# Patient Record
Sex: FEMALE | Race: OTHER | URBAN - NONMETROPOLITAN AREA
[De-identification: names, ages, dates, MRNs, and addresses within clinical notes are randomized per-mention and may not be internally consistent; named-entity substitution may affect disease eponyms.]

---

## 2019-09-09 ENCOUNTER — HOSPITAL ENCOUNTER (EMERGENCY)
Age: 19
Discharge: HOME | End: 2019-09-09

## 2019-09-09 VITALS
WEIGHT: 105 LBS | RESPIRATION RATE: 18 BRPM | TEMPERATURE: 98 F | SYSTOLIC BLOOD PRESSURE: 105 MMHG | HEIGHT: 61 IN | OXYGEN SATURATION: 100 % | DIASTOLIC BLOOD PRESSURE: 68 MMHG | BODY MASS INDEX: 19.83 KG/M2 | HEART RATE: 72 BPM

## 2019-09-09 DIAGNOSIS — Z48.02 VISIT FOR SUTURE REMOVAL: Primary | ICD-10-CM

## 2019-09-09 PROCEDURE — 99281 EMR DPT VST MAYX REQ PHY/QHP: CPT

## 2019-09-09 SDOH — HEALTH STABILITY: MENTAL HEALTH: HOW OFTEN DO YOU HAVE A DRINK CONTAINING ALCOHOL?: NEVER

## 2019-09-09 ASSESSMENT — ENCOUNTER SYMPTOMS
COUGH: 0
APNEA: 0
EYE DISCHARGE: 0
NAUSEA: 0
PHOTOPHOBIA: 0
ABDOMINAL DISTENTION: 0
COLOR CHANGE: 0
BACK PAIN: 0
SHORTNESS OF BREATH: 0
SORE THROAT: 0
RHINORRHEA: 0
EYE PAIN: 0
ABDOMINAL PAIN: 0

## 2019-09-09 NOTE — ED PROVIDER NOTES
[facility] - [facility] EMERGENCY DEPT  eMERGENCYdEPARTMENT eNCOUnter      Pt Name: Rosario Crabtree  MRN: [id number]  [place] 2000  Date of evaluation: 9/9/2019  Provider:JULIETTE Meyers    CHIEF COMPLAINT       Chief Complaint   Patient presents with    Suture / Staple Removal         HISTORY OF PRESENT ILLNESS  (Location/Symptom, Timing/Onset, Context/Setting, Quality, Duration, Modifying Factors, Severity.)   Rosario Crabtree is a 20 y.o. female who presents to the emergency department for suture removal. 8 in right forearm. 1 in left thumb. No complaints no active drainage or erythema. Has full ROM was seen one week ago. HPI    Nursing Notes were reviewed and I agree. REVIEW OF SYSTEMS    (2-9 systems for level 4, 10 or more for level 5)     Review of Systems   Constitutional: Negative for activity change, appetite change, chills and fever. HENT: Negative for congestion, postnasal drip, rhinorrhea and sore throat. Eyes: Negative for photophobia, pain, discharge and visual disturbance. Respiratory: Negative for apnea, cough and shortness of breath. Cardiovascular: Negative for chest pain and leg swelling. Gastrointestinal: Negative for abdominal distention, abdominal pain and nausea. Genitourinary: Negative for vaginal bleeding. Musculoskeletal: Negative for arthralgias, back pain, joint swelling, neck pain and neck stiffness. Skin: Positive for wound. Negative for color change and rash. Neurological: Negative for dizziness, syncope, facial asymmetry and headaches. Hematological: Negative for adenopathy. Does not bruise/bleed easily. Psychiatric/Behavioral: Negative for agitation, behavioral problems and confusion. Except as noted above the remainder of the review of systems was reviewed and negative. PAST MEDICAL HISTORY   History reviewed. No pertinent past medical history. SURGICAL HISTORY     History reviewed. No pertinent surgical history.       CURRENT MEDICATIONS Marlen Dolan, [place]  09/09/19 [id number]

## 2020-01-20 ENCOUNTER — OFFICE VISIT (OUTPATIENT)
Dept: RETAIL CLINIC | Facility: CLINIC | Age: 20
End: 2020-01-20

## 2020-01-20 DIAGNOSIS — T78.40XA ALLERGIC REACTION, INITIAL ENCOUNTER: Primary | ICD-10-CM

## 2020-01-20 PROCEDURE — 99203 OFFICE O/P NEW LOW 30 MIN: CPT | Performed by: NURSE PRACTITIONER

## 2020-01-20 RX ORDER — METHYLPREDNISOLONE ACETATE 80 MG/ML
80 INJECTION, SUSPENSION INTRA-ARTICULAR; INTRALESIONAL; INTRAMUSCULAR; SOFT TISSUE ONCE
Status: COMPLETED | OUTPATIENT
Start: 2020-01-20 | End: 2020-01-20

## 2020-01-20 RX ORDER — DIPHENHYDRAMINE HCL 25 MG
50 CAPSULE ORAL ONCE
Status: COMPLETED | OUTPATIENT
Start: 2020-01-20 | End: 2020-01-20

## 2020-01-20 RX ADMIN — Medication 50 MG: at 13:24

## 2020-01-20 RX ADMIN — METHYLPREDNISOLONE ACETATE 80 MG: 80 INJECTION, SUSPENSION INTRA-ARTICULAR; INTRALESIONAL; INTRAMUSCULAR; SOFT TISSUE at 13:21

## 2020-01-20 NOTE — PATIENT INSTRUCTIONS
Stop taking the medicine you were given for flu. May take benadryl 25 mg every four hours as needed for rash.     Erupción medicamentosa  Drug Rash    Luis erupción medicamentosa ocurre cuando un medicamento provoca un cambio en el color o la textura de la piel. Puede desarrollarse minutos, horas o días después de lisset el medicamento. La erupción puede aparecer en luis aris pequeña de piel o en todo el cuerpo.  ¿Cuáles son las causas?  Generalmente, la causa de esta afección es la reacción del cuerpo (alergia) a un medicamento. También puede ser causada por la exposición a la amador del sol después de lisset un medicamento que hace que la piel sea sensible a la amador.  Aunque cualquier medicamento puede causar luis erupción o reacción, los medicamentos que más probablemente causen luis erupción incluyen los siguientes:  · La penicilina.  · Los antibióticos.  · Los medicamentos para tratar las convulsiones.  · Los medicamentos para tratar el cáncer (quimioterapia).  · La aspirina y otros antiinflamatorios no esteroideos (DONTAE).  · Los tintes inyectables que contienen yodo.  · La insulina.  ¿Cuáles son los signos o los síntomas?  Los síntomas de esta afección incluyen los siguientes:  · Enrojecimiento.  · Pequeñas protuberancias.  · Descamación.  · Picazón.  · Ronchas que causan picazón (urticaria).  · Hinchazón.  ¿Cómo se diagnostica?  Esta afección se puede diagnosticar en función de lo siguiente:  · Un examen físico.  · Estudios para averiguar qué medicamentos causaron la erupción. Estos estudios pueden incluir los siguientes:  ? Pruebas cutáneas.  ? Análisis de kiko.  ? Prueba de provocación. Para esta prueba, debe dejar de lisset todos los medicamentos que no necesita recibir. Luego, debe comenzar a tomarlos nuevamente agregando un medicamento a la vez.  ¿Cómo se trata?  Esta afección se trata con medicamentos, por ejemplo:  · Un antihistamínico. [place] se puede administrar para aliviar la picazón.  · Antiinflamatorios no  esteroides (DONTAE). Estos se pueden administrar para reducir la hinchazón y para tratar el dolor.  · Un medicamento con corticoesteroide. Herminia se puede administrar para reducir la hinchazón.  Generalmente, la erupción desaparece al dejar de lisset el medicamento que la causó.  Siga estas indicaciones en mary casa:  · [place] los medicamentos de venta darrion y los recetados solamente clay se lo haya indicado el médico.  · Informe a todos larissa médicos acerca de cualquier reacción medicamentosa que haya tenido en el pasado.  · Si la erupción fue ocasionada por la sensibilidad a la amador del sol, mientras se agustina la erupción:  ? De ser posible, evite estar al sol, en especial cuando está más edd, normalmente entre las 10 a. m. y las 4 p. m.  ? Cúbrase la piel con pantalones, mangas largas y un sombrero cuando esté expuesto a la amador del sol.  · Si tiene urticaria:  ? [place] rebecca ducha de agua fría o utilice rebecca compresa fría para aliviar la picazón.  ? Utilice antihistamínicos de venta darrion, según lo recomendado por el médico, hasta que la urticaria haya desaparecido. La urticaria no es contagiosa.  · Concurra a todas las visitas de seguimiento clay se lo haya indicado el médico. [place] es importante.  Comuníquese con un médico si tiene:  · Fiebre.  · Rebecca erupción que no desaparece.  · Rebecca erupción que empeora.  · Rebecca erupción que reaparece.  · Tos o sibilancias.  Solicite ayuda de inmediato si:  · Comienza a tener problemas respiratorios.  · Comienza a faltarle el aire.  · El handy o la garganta comienza a hinchársele.  · Tiene debilidad intensa con mareos o desmayos.  · Siente dolor en el pecho.  Estos síntomas pueden representar un problema grave que constituye rebecca emergencia. No espere hasta que los síntomas desaparezcan. Solicite atención médica de inmediato. Comuníquese con el servicio de emergencias de mary localidad (911 en los [place]). No conduzca por larissa propios medios hasta el hospital.  Resumen  · Rebecca erupción  medicamentosa ocurre cuando un medicamento provoca un cambio en el color o la textura de la piel. La erupción puede aparecer en luis aris pequeña de piel o en todo el cuerpo.  · Puede desarrollarse minutos, horas o días después de lisset el medicamento.  · El médico hará varios estudios para determinar qué medicamento causó la erupción.  · La erupción puede tratarse con medicamentos para aliviar la picazón, la hinchazón y el dolor.  Esta información no tiene clay fin reemplazar el consejo del médico. Asegúrese de hacerle al médico cualquier pregunta que tenga.  Document Released: 12/18/2006 Document Revised: 01/11/2019 Document Reviewed: 01/11/2019  Elsevier Interactive Patient Education © 2019 Elsevier Inc.

## 2020-01-20 NOTE — PROGRESS NOTES
Subjective   Sabrina HODGSON is a 19 y.o. female.     Itz her friend is translating for her and states she was diagnosed with the flu. At a clinic at her work they gave her some flu medicine and cough medicine. They think it was Tamiflu and Mucinex. She started the medicine yesterday and broke out in a rash all over her body. It does itch. She denies difficulty breathing. She took more medicine this morning and the rash got worse. Office staff attempted to contact her employer but had no luck reaching a health care provider to get more information on what she took. She does not know what pharmacy dispensed the medicine.    Rash   This is a new problem. The current episode started yesterday. The rash is diffuse. The rash is characterized by pain, itchiness and redness. She was exposed to a new medication. Associated symptoms include rhinorrhea. Pertinent negatives include no anorexia, congestion, cough, diarrhea, eye pain, facial edema, fatigue, fever, joint pain, nail changes, shortness of breath, sore throat or vomiting. Past treatments include nothing. The treatment provided no relief. There is no history of allergies, asthma, eczema or varicella.        The following portions of the patient's history were reviewed and updated as appropriate: allergies, current medications, past family history, past medical history, past social history, past surgical history and problem list.    Review of Systems   Constitutional: Negative for fatigue and fever.   HENT: Positive for rhinorrhea. Negative for congestion and sore throat.    Eyes: Negative for pain.   Respiratory: Negative for cough and shortness of breath.    Gastrointestinal: Negative for anorexia, diarrhea and vomiting.   Musculoskeletal: Negative for joint pain.   Skin: Positive for rash. Negative for nail changes.       Objective   Physical Exam   Constitutional: She is oriented to person, place, and time. She appears well-developed and well-nourished.   HENT:    Head: Normocephalic.   Right Ear: External ear normal.   Left Ear: External ear normal.   Mouth/Throat: Oropharynx is clear and moist.   Eyes: Conjunctivae are normal.   Cardiovascular: Regular rhythm.   Pulmonary/Chest: Effort normal and breath sounds normal. No respiratory distress.   Abdominal: Soft.   Neurological: She is alert and oriented to person, place, and time.   Skin: Rash noted.         Assessment/Plan   Sabrina was seen today for rash.    Diagnoses and all orders for this visit:    Allergic reaction, initial encounter  -     methylPREDNISolone acetate (DEPO-medrol) injection 80 mg  -     diphenhydrAMINE (BENADRYL) capsule 50 mg        Instructed patient and friend to stop taking the medicine she was given by her work clinic. Take benadryl every four hours as needed for rash. If she develops any difficulty in breathing go to the ER.   I asked the patient to wait for 30 mins after the administration of the steroid shot and benadryl to ensure no allergic reaction occurred.

## 2021-06-24 ENCOUNTER — INITIAL PRENATAL (OUTPATIENT)
Dept: OBSTETRICS AND GYNECOLOGY | Facility: CLINIC | Age: 21
End: 2021-06-24

## 2021-06-24 VITALS
HEIGHT: 61 IN | WEIGHT: 132 LBS | DIASTOLIC BLOOD PRESSURE: 64 MMHG | SYSTOLIC BLOOD PRESSURE: 104 MMHG | BODY MASS INDEX: 24.92 KG/M2

## 2021-06-24 DIAGNOSIS — B37.31 CANDIDIASIS, VAGINA: ICD-10-CM

## 2021-06-24 DIAGNOSIS — O34.219 PREVIOUS CESAREAN DELIVERY, ANTEPARTUM: Primary | ICD-10-CM

## 2021-06-24 PROCEDURE — 99203 OFFICE O/P NEW LOW 30 MIN: CPT | Performed by: OBSTETRICS & GYNECOLOGY

## 2021-06-24 RX ORDER — FLUCONAZOLE 150 MG/1
150 TABLET ORAL ONCE
Qty: 1 TABLET | Refills: 0 | Status: SHIPPED | OUTPATIENT
Start: 2021-06-24 | End: 2021-06-24

## 2021-06-24 NOTE — PROGRESS NOTES
New OB, US ordered today, reviewed and shows 23 weeks gestation, EDC 10/18/21  Late entry to care, c/o yellow vaginal discharge  Prior uncomplicated pregnancy and  delivery in [facility]  Feeling fetal movement  Thick white discharge on speculum exam  New OB labs ordered today  She asks about the option of TOLAC, discussed 1% risk of uterine rupture  Discussed normal prenatal routines  Questions answered    Diagnoses and all orders for this visit:    1. Previous  delivery, antepartum (Primary)  -     Chlamydia trachomatis, Neisseria gonorrhoeae, PCR w/ confirmation - Urine, Urine, Clean Catch  -     ABO / Rh  -     Ambulatory Referral to Perinatology  -     Antibody Screen  -     CBC & Differential  -     Urine Culture - Urine, Urine, Clean Catch  -     ToxASSURE Select 13 (MW) - Urine, Clean Catch  -     RPR  -     Rubella Antibody, IgG  -     HIV-1 / O / 2 Ag / Antibody 4th Generation  -     Hepatitis B Surface Antigen

## 2021-07-01 LAB
ABO GROUP BLD: NORMAL
BACTERIA UR CULT: ABNORMAL
BACTERIA UR CULT: ABNORMAL
BASOPHILS # BLD AUTO: 0.04 10*3/MM3 (ref 0–0.2)
BASOPHILS NFR BLD AUTO: 0.4 % (ref 0–1.5)
BLD GP AB SCN SERPL QL: NEGATIVE
C TRACH RRNA SPEC QL NAA+PROBE: POSITIVE
C TRACH RRNA SPEC QL NAA+PROBE: POSITIVE
DRUGS UR: NORMAL
EOSINOPHIL # BLD AUTO: 0.05 10*3/MM3 (ref 0–0.4)
EOSINOPHIL NFR BLD AUTO: 0.5 % (ref 0.3–6.2)
ERYTHROCYTE [DISTWIDTH] IN BLOOD BY AUTOMATED COUNT: 12.4 % (ref 12.3–15.4)
HBV SURFACE AG SERPL QL IA: NEGATIVE
HCT VFR BLD AUTO: 34.7 % (ref 34–46.6)
HGB BLD-MCNC: 11.1 G/DL (ref 12–15.9)
HIV 1+2 AB+HIV1 P24 AG SERPL QL IA: NON REACTIVE
IMM GRANULOCYTES # BLD AUTO: 0.02 10*3/MM3 (ref 0–0.05)
IMM GRANULOCYTES NFR BLD AUTO: 0.2 % (ref 0–0.5)
LYMPHOCYTES # BLD AUTO: 1.87 10*3/MM3 (ref 0.7–3.1)
LYMPHOCYTES NFR BLD AUTO: 19.7 % (ref 19.6–45.3)
MCH RBC QN AUTO: 30.4 PG (ref 26.6–33)
MCHC RBC AUTO-ENTMCNC: 32 G/DL (ref 31.5–35.7)
MCV RBC AUTO: 95.1 FL (ref 79–97)
MONOCYTES # BLD AUTO: 0.5 10*3/MM3 (ref 0.1–0.9)
MONOCYTES NFR BLD AUTO: 5.3 % (ref 5–12)
N GONORRHOEA RRNA SPEC QL NAA+PROBE: NEGATIVE
NEUTROPHILS # BLD AUTO: 7 10*3/MM3 (ref 1.7–7)
NEUTROPHILS NFR BLD AUTO: 73.9 % (ref 42.7–76)
NRBC BLD AUTO-RTO: 0 /100 WBC (ref 0–0.2)
PLATELET # BLD AUTO: 340 10*3/MM3 (ref 140–450)
RBC # BLD AUTO: 3.65 10*6/MM3 (ref 3.77–5.28)
RH BLD: POSITIVE
RPR SER QL: NON REACTIVE
RUBV IGG SERPL IA-ACNC: 1.16 INDEX
WBC # BLD AUTO: 9.48 10*3/MM3 (ref 3.4–10.8)

## 2021-08-24 ENCOUNTER — ROUTINE PRENATAL (OUTPATIENT)
Dept: OBSTETRICS AND GYNECOLOGY | Facility: CLINIC | Age: 21
End: 2021-08-24

## 2021-08-24 VITALS — BODY MASS INDEX: 25.7 KG/M2 | SYSTOLIC BLOOD PRESSURE: 100 MMHG | WEIGHT: 136 LBS | DIASTOLIC BLOOD PRESSURE: 64 MMHG

## 2021-08-24 DIAGNOSIS — O34.219 PREVIOUS CESAREAN DELIVERY, ANTEPARTUM: Primary | ICD-10-CM

## 2021-08-24 LAB
GLUCOSE UR STRIP-MCNC: ABNORMAL MG/DL
PROT UR STRIP-MCNC: NEGATIVE MG/DL

## 2021-08-24 PROCEDURE — 99213 OFFICE O/P EST LOW 20 MIN: CPT | Performed by: OBSTETRICS & GYNECOLOGY

## 2021-08-24 RX ORDER — AZITHROMYCIN 500 MG/1
1000 TABLET, FILM COATED ORAL ONCE
Qty: 2 TABLET | Refills: 0 | Status: SHIPPED | OUTPATIENT
Start: 2021-08-24 | End: 2021-08-24

## 2021-08-24 NOTE — PROGRESS NOTES
Good fetal movement  Has not been seen in 9 weeks  Rx Azithromycin for + Chlamydia  Glucola and Hgb today  US next visit for limited prenatal care  Discuss Tdap next visit   labor precautions  Again discussed delivery options and patient still desires TOLAC, discussed risk of uterine rupture  It is unclear why she had her first , so it is difficult to estimate chance of successful     Diagnoses and all orders for this visit:    1. Previous  delivery, antepartum (Primary)  -     azithromycin (Zithromax) 500 MG tablet; Take 2 tablets by mouth 1 (One) Time for 1 dose.  Dispense: 2 tablet; Refill: 0  -     Gestational Screen 1 Hr ([facility])  -     Hemoglobin

## 2021-08-25 LAB — GLUCOSE 1H P 50 G GLC PO SERPL-MCNC: 164 MG/DL (ref 65–139)

## 2021-09-09 ENCOUNTER — ROUTINE PRENATAL (OUTPATIENT)
Dept: OBSTETRICS AND GYNECOLOGY | Facility: CLINIC | Age: 21
End: 2021-09-09

## 2021-09-09 VITALS — DIASTOLIC BLOOD PRESSURE: 68 MMHG | BODY MASS INDEX: 26.83 KG/M2 | WEIGHT: 142 LBS | SYSTOLIC BLOOD PRESSURE: 106 MMHG

## 2021-09-09 DIAGNOSIS — O99.810 ABNORMAL GLUCOSE TOLERANCE IN PREGNANCY: ICD-10-CM

## 2021-09-09 DIAGNOSIS — O34.219 PREVIOUS CESAREAN DELIVERY, ANTEPARTUM: Primary | ICD-10-CM

## 2021-09-09 LAB
GLUCOSE UR STRIP-MCNC: NEGATIVE MG/DL
PROT UR STRIP-MCNC: ABNORMAL MG/DL

## 2021-09-09 PROCEDURE — 99213 OFFICE O/P EST LOW 20 MIN: CPT | Performed by: OBSTETRICS & GYNECOLOGY

## 2021-09-09 NOTE — PROGRESS NOTES
Good fetal movement  Missed appointment on Tuesday  Growth US next visit  Reviewed abnormal 1 hour glucose screen, needs to do 3 hour test    Diagnoses and all orders for this visit:    1. Previous  delivery, antepartum (Primary)    2. Abnormal glucose tolerance in pregnancy

## 2021-09-16 ENCOUNTER — ROUTINE PRENATAL (OUTPATIENT)
Dept: OBSTETRICS AND GYNECOLOGY | Facility: CLINIC | Age: 21
End: 2021-09-16

## 2021-09-16 VITALS — BODY MASS INDEX: 25.89 KG/M2 | DIASTOLIC BLOOD PRESSURE: 68 MMHG | WEIGHT: 137 LBS | SYSTOLIC BLOOD PRESSURE: 104 MMHG

## 2021-09-16 DIAGNOSIS — O99.810 ABNORMAL GLUCOSE TOLERANCE TEST IN PREGNANCY: ICD-10-CM

## 2021-09-16 DIAGNOSIS — O34.219 PREVIOUS CESAREAN DELIVERY, ANTEPARTUM: Primary | ICD-10-CM

## 2021-09-16 LAB
GLUCOSE UR STRIP-MCNC: NEGATIVE MG/DL
PROT UR STRIP-MCNC: NEGATIVE MG/DL

## 2021-09-16 PROCEDURE — 99213 OFFICE O/P EST LOW 20 MIN: CPT | Performed by: OBSTETRICS & GYNECOLOGY

## 2021-09-16 NOTE — PROGRESS NOTES
Good fetal movement  Labor precautions  Ultrasound today 12% growth, WILFRIDO 12.5cm  3 hour glucose screening topday  Prior GBS positive urine culture  Repeat GC/Chl next visit  Still desires TOLAC    Diagnoses and all orders for this visit:    1. Previous  delivery, antepartum (Primary)    2. Abnormal glucose tolerance test in pregnancy  -     Gestational Screen 3 Hr ([facility])

## 2021-09-17 LAB
GLUCOSE 1H P 100 G GLC PO SERPL-MCNC: 134 MG/DL (ref 65–179)
GLUCOSE 2H P 100 G GLC PO SERPL-MCNC: 89 MG/DL (ref 65–154)
GLUCOSE 3H P 100 G GLC PO SERPL-MCNC: 116 MG/DL (ref 65–139)
GLUCOSE P FAST SERPL-MCNC: 85 MG/DL (ref 65–94)

## 2021-09-27 ENCOUNTER — ROUTINE PRENATAL (OUTPATIENT)
Dept: OBSTETRICS AND GYNECOLOGY | Facility: CLINIC | Age: 21
End: 2021-09-27

## 2021-09-27 VITALS — SYSTOLIC BLOOD PRESSURE: 112 MMHG | DIASTOLIC BLOOD PRESSURE: 82 MMHG | WEIGHT: 140 LBS | BODY MASS INDEX: 26.45 KG/M2

## 2021-09-27 DIAGNOSIS — O34.219 PREVIOUS CESAREAN DELIVERY, ANTEPARTUM: Primary | ICD-10-CM

## 2021-09-27 LAB
GLUCOSE UR STRIP-MCNC: NEGATIVE MG/DL
PROT UR STRIP-MCNC: ABNORMAL MG/DL

## 2021-09-27 PROCEDURE — 99213 OFFICE O/P EST LOW 20 MIN: CPT | Performed by: OBSTETRICS & GYNECOLOGY

## 2021-09-27 RX ORDER — FAMOTIDINE 20 MG/1
20 TABLET, FILM COATED ORAL 2 TIMES DAILY
Qty: 30 TABLET | Refills: 2 | Status: SHIPPED | OUTPATIENT
Start: 2021-09-27 | End: 2021-11-11

## 2021-09-27 NOTE — PROGRESS NOTES
Good fetal movement  Few contractions  Repeat Chlamydia test today  Prior GBS positive urine  Labor instructions    Diagnoses and all orders for this visit:    1. Previous  delivery, antepartum (Primary)  -     Chlamydia trachomatis, Neisseria gonorrhoeae, PCR w/ confirmation - Swab, Cervix

## 2021-09-29 LAB
C TRACH RRNA SPEC QL NAA+PROBE: NEGATIVE
N GONORRHOEA RRNA SPEC QL NAA+PROBE: NEGATIVE

## 2021-10-06 ENCOUNTER — ROUTINE PRENATAL (OUTPATIENT)
Dept: OBSTETRICS AND GYNECOLOGY | Facility: CLINIC | Age: 21
End: 2021-10-06

## 2021-10-06 VITALS — SYSTOLIC BLOOD PRESSURE: 108 MMHG | WEIGHT: 139 LBS | BODY MASS INDEX: 26.26 KG/M2 | DIASTOLIC BLOOD PRESSURE: 68 MMHG

## 2021-10-06 DIAGNOSIS — Z34.93 PRENATAL CARE IN THIRD TRIMESTER: Primary | ICD-10-CM

## 2021-10-06 LAB
GLUCOSE UR STRIP-MCNC: NEGATIVE MG/DL
PROT UR STRIP-MCNC: NEGATIVE MG/DL

## 2021-10-06 PROCEDURE — 99212 OFFICE O/P EST SF 10 MIN: CPT | Performed by: OBSTETRICS & GYNECOLOGY

## 2021-10-06 NOTE — PROGRESS NOTES
at 38.2 IUP Presents for follow up prenatal care visit. She reports occasional contractions at this time but no regular contractions. She has a  with her named Junie Zhu.  PE see above   A/P  at 38.2 IUP   Discussed risk benefits to trial of labor versus repeat  section.  Discussed with patient risk of uterine rupture and that is less than 1%.  At this point patient requesting to schedule repeat  section.  We will have patient return to clinic to further discuss this with Dr. Fried on Monday.  Labor precautions discussed.  I offered to use the  phone but patient reported that she felt comfortable using the  that the health department had provided with her.

## 2021-10-15 ENCOUNTER — ANESTHESIA EVENT (OUTPATIENT)
Dept: LABOR AND DELIVERY | Facility: HOSPITAL | Age: 21
End: 2021-10-15

## 2021-10-15 ENCOUNTER — HOSPITAL ENCOUNTER (INPATIENT)
Facility: HOSPITAL | Age: 21
LOS: 2 days | Discharge: HOME | End: 2021-10-17
Attending: OBSTETRICS & GYNECOLOGY | Admitting: OBSTETRICS & GYNECOLOGY

## 2021-10-15 ENCOUNTER — ANESTHESIA (OUTPATIENT)
Dept: LABOR AND DELIVERY | Facility: HOSPITAL | Age: 21
End: 2021-10-15

## 2021-10-15 DIAGNOSIS — Z98.891 STATUS POST REPEAT LOW TRANSVERSE CESAREAN SECTION: Primary | ICD-10-CM

## 2021-10-15 LAB
ABO GROUP BLD: NORMAL
BLD GP AB SCN SERPL QL: NEGATIVE
DEPRECATED RDW RBC AUTO: 47.1 FL (ref 37–54)
ERYTHROCYTE [DISTWIDTH] IN BLOOD BY AUTOMATED COUNT: 15.3 % (ref 12.3–15.4)
HCT VFR BLD AUTO: 31.4 % (ref 34–46.6)
HGB BLD-MCNC: 10 G/DL (ref 12–15.9)
MCH RBC QN AUTO: 27 PG (ref 26.6–33)
MCHC RBC AUTO-ENTMCNC: 31.8 G/DL (ref 31.5–35.7)
MCV RBC AUTO: 84.9 FL (ref 79–97)
PLATELET # BLD AUTO: 350 10*3/MM3 (ref 140–450)
PMV BLD AUTO: 10.2 FL (ref 6–12)
RBC # BLD AUTO: 3.7 10*6/MM3 (ref 3.77–5.28)
RH BLD: POSITIVE
SARS-COV-2 RNA PNL SPEC NAA+PROBE: NOT DETECTED
T&S EXPIRATION DATE: NORMAL
WBC # BLD AUTO: 6.58 10*3/MM3 (ref 3.4–10.8)

## 2021-10-15 PROCEDURE — 86850 RBC ANTIBODY SCREEN: CPT | Performed by: OBSTETRICS & GYNECOLOGY

## 2021-10-15 PROCEDURE — 86900 BLOOD TYPING SEROLOGIC ABO: CPT | Performed by: OBSTETRICS & GYNECOLOGY

## 2021-10-15 PROCEDURE — 85027 COMPLETE CBC AUTOMATED: CPT | Performed by: OBSTETRICS & GYNECOLOGY

## 2021-10-15 PROCEDURE — 59515 CESAREAN DELIVERY: CPT | Performed by: OBSTETRICS & GYNECOLOGY

## 2021-10-15 PROCEDURE — 25010000002 NALBUPHINE PER 10 MG: Performed by: NURSE ANESTHETIST, CERTIFIED REGISTERED

## 2021-10-15 PROCEDURE — 86901 BLOOD TYPING SEROLOGIC RH(D): CPT | Performed by: OBSTETRICS & GYNECOLOGY

## 2021-10-15 PROCEDURE — 87635 SARS-COV-2 COVID-19 AMP PRB: CPT | Performed by: OBSTETRICS & GYNECOLOGY

## 2021-10-15 PROCEDURE — 25010000002 ONDANSETRON PER 1 MG: Performed by: NURSE ANESTHETIST, CERTIFIED REGISTERED

## 2021-10-15 PROCEDURE — 25010000002 KETOROLAC TROMETHAMINE PER 15 MG: Performed by: NURSE ANESTHETIST, CERTIFIED REGISTERED

## 2021-10-15 PROCEDURE — 25010000002 HYDROMORPHONE 1 MG/ML SOLUTION: Performed by: NURSE ANESTHETIST, CERTIFIED REGISTERED

## 2021-10-15 PROCEDURE — 51702 INSERT TEMP BLADDER CATH: CPT

## 2021-10-15 PROCEDURE — 25010000002 CEFAZOLIN PER 500 MG: Performed by: OBSTETRICS & GYNECOLOGY

## 2021-10-15 RX ORDER — OXYTOCIN 10 [USP'U]/ML
INJECTION, SOLUTION INTRAMUSCULAR; INTRAVENOUS AS NEEDED
Status: DISCONTINUED | OUTPATIENT
Start: 2021-10-15 | End: 2021-10-15 | Stop reason: SURG

## 2021-10-15 RX ORDER — LIDOCAINE HYDROCHLORIDE 10 MG/ML
5 INJECTION, SOLUTION EPIDURAL; INFILTRATION; INTRACAUDAL; PERINEURAL AS NEEDED
Status: DISCONTINUED | OUTPATIENT
Start: 2021-10-15 | End: 2021-10-16 | Stop reason: HOSPADM

## 2021-10-15 RX ORDER — OXYTOCIN/0.9 % SODIUM CHLORIDE 30/500 ML
999 PLASTIC BAG, INJECTION (ML) INTRAVENOUS ONCE
Status: DISCONTINUED | OUTPATIENT
Start: 2021-10-15 | End: 2021-10-16 | Stop reason: HOSPADM

## 2021-10-15 RX ORDER — OXYTOCIN/0.9 % SODIUM CHLORIDE 30/500 ML
250 PLASTIC BAG, INJECTION (ML) INTRAVENOUS CONTINUOUS
Status: ACTIVE | OUTPATIENT
Start: 2021-10-15 | End: 2021-10-15

## 2021-10-15 RX ORDER — BUPIVACAINE HYDROCHLORIDE 7.5 MG/ML
INJECTION, SOLUTION EPIDURAL; RETROBULBAR AS NEEDED
Status: DISCONTINUED | OUTPATIENT
Start: 2021-10-15 | End: 2021-10-15 | Stop reason: SURG

## 2021-10-15 RX ORDER — BUPIVACAINE HCL/0.9 % NACL/PF 0.1 %
2 PLASTIC BAG, INJECTION (ML) EPIDURAL ONCE
Status: COMPLETED | OUTPATIENT
Start: 2021-10-15 | End: 2021-10-15

## 2021-10-15 RX ORDER — ONDANSETRON 2 MG/ML
INJECTION INTRAMUSCULAR; INTRAVENOUS AS NEEDED
Status: DISCONTINUED | OUTPATIENT
Start: 2021-10-15 | End: 2021-10-15 | Stop reason: SURG

## 2021-10-15 RX ORDER — TRISODIUM CITRATE DIHYDRATE AND CITRIC ACID MONOHYDRATE 500; 334 MG/5ML; MG/5ML
15 SOLUTION ORAL ONCE
Status: COMPLETED | OUTPATIENT
Start: 2021-10-15 | End: 2021-10-15

## 2021-10-15 RX ORDER — FAMOTIDINE 10 MG/ML
20 INJECTION, SOLUTION INTRAVENOUS ONCE AS NEEDED
Status: COMPLETED | OUTPATIENT
Start: 2021-10-15 | End: 2021-10-15

## 2021-10-15 RX ORDER — OXYTOCIN/0.9 % SODIUM CHLORIDE 30/500 ML
125 PLASTIC BAG, INJECTION (ML) INTRAVENOUS CONTINUOUS PRN
Status: DISCONTINUED | OUTPATIENT
Start: 2021-10-15 | End: 2021-10-16 | Stop reason: HOSPADM

## 2021-10-15 RX ORDER — METHYLERGONOVINE MALEATE 0.2 MG/ML
200 INJECTION INTRAVENOUS ONCE AS NEEDED
Status: CANCELLED | OUTPATIENT
Start: 2021-10-15

## 2021-10-15 RX ORDER — CARBOPROST TROMETHAMINE 250 UG/ML
250 INJECTION, SOLUTION INTRAMUSCULAR
Status: CANCELLED | OUTPATIENT
Start: 2021-10-15

## 2021-10-15 RX ORDER — MISOPROSTOL 200 UG/1
800 TABLET ORAL ONCE AS NEEDED
Status: CANCELLED | OUTPATIENT
Start: 2021-10-15

## 2021-10-15 RX ORDER — SODIUM CHLORIDE, SODIUM LACTATE, POTASSIUM CHLORIDE, CALCIUM CHLORIDE 600; 310; 30; 20 MG/100ML; MG/100ML; MG/100ML; MG/100ML
125 INJECTION, SOLUTION INTRAVENOUS CONTINUOUS
Status: DISCONTINUED | OUTPATIENT
Start: 2021-10-15 | End: 2021-10-17 | Stop reason: HOSPADM

## 2021-10-15 RX ORDER — MISOPROSTOL 200 UG/1
800 TABLET ORAL ONCE
Status: COMPLETED | OUTPATIENT
Start: 2021-10-16 | End: 2021-10-15

## 2021-10-15 RX ORDER — IBUPROFEN 600 MG/1
600 TABLET ORAL EVERY 8 HOURS PRN
Status: DISCONTINUED | OUTPATIENT
Start: 2021-10-15 | End: 2021-10-17 | Stop reason: HOSPADM

## 2021-10-15 RX ORDER — KETOROLAC TROMETHAMINE 30 MG/ML
INJECTION, SOLUTION INTRAMUSCULAR; INTRAVENOUS AS NEEDED
Status: DISCONTINUED | OUTPATIENT
Start: 2021-10-15 | End: 2021-10-15 | Stop reason: SURG

## 2021-10-15 RX ORDER — SODIUM CHLORIDE 0.9 % (FLUSH) 0.9 %
10 SYRINGE (ML) INJECTION EVERY 12 HOURS SCHEDULED
Status: DISCONTINUED | OUTPATIENT
Start: 2021-10-15 | End: 2021-10-16 | Stop reason: HOSPADM

## 2021-10-15 RX ORDER — NALBUPHINE HCL 10 MG/ML
2.5 AMPUL (ML) INJECTION EVERY 4 HOURS PRN
Status: DISPENSED | OUTPATIENT
Start: 2021-10-15 | End: 2021-10-16

## 2021-10-15 RX ORDER — SODIUM CHLORIDE 0.9 % (FLUSH) 0.9 %
10 SYRINGE (ML) INJECTION AS NEEDED
Status: DISCONTINUED | OUTPATIENT
Start: 2021-10-15 | End: 2021-10-16 | Stop reason: HOSPADM

## 2021-10-15 RX ADMIN — FAMOTIDINE 20 MG: 10 INJECTION INTRAVENOUS at 19:49

## 2021-10-15 RX ADMIN — NALBUPHINE HYDROCHLORIDE 2.5 MG: 10 INJECTION, SOLUTION INTRAMUSCULAR; INTRAVENOUS; SUBCUTANEOUS at 22:00

## 2021-10-15 RX ADMIN — HYDROMORPHONE HYDROCHLORIDE 300 MCG: 1 INJECTION, SOLUTION INTRAMUSCULAR; INTRAVENOUS; SUBCUTANEOUS at 20:28

## 2021-10-15 RX ADMIN — SODIUM CHLORIDE, POTASSIUM CHLORIDE, SODIUM LACTATE AND CALCIUM CHLORIDE 125 ML/HR: 600; 310; 30; 20 INJECTION, SOLUTION INTRAVENOUS at 16:40

## 2021-10-15 RX ADMIN — HYDROMORPHONE HYDROCHLORIDE 200 MCG: 1 INJECTION, SOLUTION INTRAMUSCULAR; INTRAVENOUS; SUBCUTANEOUS at 20:38

## 2021-10-15 RX ADMIN — SODIUM CHLORIDE, POTASSIUM CHLORIDE, SODIUM LACTATE AND CALCIUM CHLORIDE: 600; 310; 30; 20 INJECTION, SOLUTION INTRAVENOUS at 20:55

## 2021-10-15 RX ADMIN — SODIUM CHLORIDE, POTASSIUM CHLORIDE, SODIUM LACTATE AND CALCIUM CHLORIDE: 600; 310; 30; 20 INJECTION, SOLUTION INTRAVENOUS at 20:22

## 2021-10-15 RX ADMIN — HYDROMORPHONE HYDROCHLORIDE 200 MCG: 1 INJECTION, SOLUTION INTRAMUSCULAR; INTRAVENOUS; SUBCUTANEOUS at 20:54

## 2021-10-15 RX ADMIN — Medication 2 G: at 20:10

## 2021-10-15 RX ADMIN — MISOPROSTOL 800 MCG: 200 TABLET ORAL at 23:22

## 2021-10-15 RX ADMIN — OXYTOCIN 20 UNITS: 10 INJECTION, SOLUTION INTRAMUSCULAR; INTRAVENOUS at 20:55

## 2021-10-15 RX ADMIN — SODIUM CITRATE AND CITRIC ACID MONOHYDRATE 15 ML: 500; 334 SOLUTION ORAL at 19:49

## 2021-10-15 RX ADMIN — KETOROLAC TROMETHAMINE 30 MG: 30 INJECTION, SOLUTION INTRAMUSCULAR; INTRAVENOUS at 20:55

## 2021-10-15 RX ADMIN — HYDROMORPHONE HYDROCHLORIDE 200 MCG: 1 INJECTION, SOLUTION INTRAMUSCULAR; INTRAVENOUS; SUBCUTANEOUS at 20:29

## 2021-10-15 RX ADMIN — BUPIVACAINE HYDROCHLORIDE 1.4 ML: 7.5 INJECTION, SOLUTION EPIDURAL; RETROBULBAR at 20:03

## 2021-10-15 RX ADMIN — OXYTOCIN 30 UNITS: 10 INJECTION, SOLUTION INTRAMUSCULAR; INTRAVENOUS at 20:24

## 2021-10-15 RX ADMIN — OXYTOCIN-SODIUM CHLORIDE 0.9% IV SOLN 30 UNIT/500ML 250 ML/HR: 30-0.9/5 SOLUTION at 22:25

## 2021-10-15 RX ADMIN — HYDROMORPHONE HYDROCHLORIDE 100 MCG: 1 INJECTION, SOLUTION INTRAMUSCULAR; INTRAVENOUS; SUBCUTANEOUS at 20:03

## 2021-10-15 RX ADMIN — ONDANSETRON 8 MG: 2 INJECTION INTRAMUSCULAR; INTRAVENOUS at 20:34

## 2021-10-15 NOTE — ANESTHESIA PREPROCEDURE EVALUATION
Anesthesia Evaluation     NPO Solid Status: > 8 hours  NPO Liquid Status: > 8 hours           Airway   Mallampati: II  TM distance: >3 FB  Neck ROM: full  Dental - normal exam     Pulmonary - normal exam   Cardiovascular - normal exam  Exercise tolerance: excellent (>7 METS)        Neuro/Psych- negative ROS  GI/Hepatic/Renal/Endo      Musculoskeletal     Abdominal    Substance History      OB/GYN    (+) Pregnant (),         Other                      Anesthesia Plan    ASA 2 - emergent     spinal       Anesthetic plan, all risks, benefits, and alternatives have been provided, discussed and informed consent has been obtained with: patient.

## 2021-10-16 LAB
BASOPHILS # BLD AUTO: 0.03 10*3/MM3 (ref 0–0.2)
BASOPHILS NFR BLD AUTO: 0.3 % (ref 0–1.5)
DEPRECATED RDW RBC AUTO: 46.3 FL (ref 37–54)
EOSINOPHIL # BLD AUTO: 0.01 10*3/MM3 (ref 0–0.4)
EOSINOPHIL NFR BLD AUTO: 0.1 % (ref 0.3–6.2)
ERYTHROCYTE [DISTWIDTH] IN BLOOD BY AUTOMATED COUNT: 15.3 % (ref 12.3–15.4)
HCT VFR BLD AUTO: 26.3 % (ref 34–46.6)
HGB BLD-MCNC: 8.3 G/DL (ref 12–15.9)
IMM GRANULOCYTES # BLD AUTO: 0.02 10*3/MM3 (ref 0–0.05)
IMM GRANULOCYTES NFR BLD AUTO: 0.2 % (ref 0–0.5)
LYMPHOCYTES # BLD AUTO: 1.46 10*3/MM3 (ref 0.7–3.1)
LYMPHOCYTES NFR BLD AUTO: 15.6 % (ref 19.6–45.3)
MCH RBC QN AUTO: 26.9 PG (ref 26.6–33)
MCHC RBC AUTO-ENTMCNC: 31.6 G/DL (ref 31.5–35.7)
MCV RBC AUTO: 85.4 FL (ref 79–97)
MONOCYTES # BLD AUTO: 0.4 10*3/MM3 (ref 0.1–0.9)
MONOCYTES NFR BLD AUTO: 4.3 % (ref 5–12)
NEUTROPHILS NFR BLD AUTO: 7.43 10*3/MM3 (ref 1.7–7)
NEUTROPHILS NFR BLD AUTO: 79.5 % (ref 42.7–76)
NRBC BLD AUTO-RTO: 0 /100 WBC (ref 0–0.2)
PLATELET # BLD AUTO: 292 10*3/MM3 (ref 140–450)
PMV BLD AUTO: 10.1 FL (ref 6–12)
RBC # BLD AUTO: 3.08 10*6/MM3 (ref 3.77–5.28)
WBC # BLD AUTO: 9.35 10*3/MM3 (ref 3.4–10.8)

## 2021-10-16 PROCEDURE — 25010000002 KETOROLAC TROMETHAMINE PER 15 MG: Performed by: OBSTETRICS & GYNECOLOGY

## 2021-10-16 PROCEDURE — 94799 UNLISTED PULMONARY SVC/PX: CPT

## 2021-10-16 PROCEDURE — 85025 COMPLETE CBC W/AUTO DIFF WBC: CPT | Performed by: OBSTETRICS & GYNECOLOGY

## 2021-10-16 PROCEDURE — 25010000002 ONDANSETRON PER 1 MG: Performed by: NURSE ANESTHETIST, CERTIFIED REGISTERED

## 2021-10-16 RX ORDER — ONDANSETRON 2 MG/ML
4 INJECTION INTRAMUSCULAR; INTRAVENOUS EVERY 6 HOURS PRN
Status: DISCONTINUED | OUTPATIENT
Start: 2021-10-16 | End: 2021-10-17 | Stop reason: HOSPADM

## 2021-10-16 RX ORDER — PRENATAL VIT/IRON FUM/FOLIC AC 27MG-0.8MG
1 TABLET ORAL DAILY
Status: DISCONTINUED | OUTPATIENT
Start: 2021-10-16 | End: 2021-10-17 | Stop reason: HOSPADM

## 2021-10-16 RX ORDER — OXYCODONE HYDROCHLORIDE AND ACETAMINOPHEN 5; 325 MG/1; MG/1
1 TABLET ORAL EVERY 4 HOURS PRN
Status: DISCONTINUED | OUTPATIENT
Start: 2021-10-17 | End: 2021-10-17 | Stop reason: HOSPADM

## 2021-10-16 RX ORDER — OXYCODONE HYDROCHLORIDE AND ACETAMINOPHEN 5; 325 MG/1; MG/1
1 TABLET ORAL EVERY 4 HOURS PRN
Status: DISCONTINUED | OUTPATIENT
Start: 2021-10-16 | End: 2021-10-16

## 2021-10-16 RX ORDER — OXYCODONE AND ACETAMINOPHEN 7.5; 325 MG/1; MG/1
1 TABLET ORAL EVERY 4 HOURS PRN
Status: DISPENSED | OUTPATIENT
Start: 2021-10-16 | End: 2021-10-17

## 2021-10-16 RX ORDER — SIMETHICONE 80 MG
80 TABLET,CHEWABLE ORAL 4 TIMES DAILY PRN
Status: DISCONTINUED | OUTPATIENT
Start: 2021-10-16 | End: 2021-10-17 | Stop reason: HOSPADM

## 2021-10-16 RX ORDER — OXYCODONE AND ACETAMINOPHEN 10; 325 MG/1; MG/1
1 TABLET ORAL EVERY 4 HOURS PRN
Status: DISCONTINUED | OUTPATIENT
Start: 2021-10-16 | End: 2021-10-16

## 2021-10-16 RX ORDER — ONDANSETRON 4 MG/1
4 TABLET, FILM COATED ORAL EVERY 8 HOURS PRN
Status: DISCONTINUED | OUTPATIENT
Start: 2021-10-16 | End: 2021-10-17 | Stop reason: HOSPADM

## 2021-10-16 RX ORDER — NALOXONE HCL 0.4 MG/ML
0.4 VIAL (ML) INJECTION
Status: ACTIVE | OUTPATIENT
Start: 2021-10-16 | End: 2021-10-17

## 2021-10-16 RX ORDER — OXYCODONE AND ACETAMINOPHEN 7.5; 325 MG/1; MG/1
2 TABLET ORAL EVERY 4 HOURS PRN
Status: ACTIVE | OUTPATIENT
Start: 2021-10-16 | End: 2021-10-17

## 2021-10-16 RX ORDER — BUTORPHANOL TARTRATE 1 MG/ML
0.5 INJECTION, SOLUTION INTRAMUSCULAR; INTRAVENOUS EVERY 6 HOURS PRN
Status: DISCONTINUED | OUTPATIENT
Start: 2021-10-16 | End: 2021-10-17 | Stop reason: HOSPADM

## 2021-10-16 RX ORDER — OXYCODONE AND ACETAMINOPHEN 10; 325 MG/1; MG/1
1 TABLET ORAL EVERY 4 HOURS PRN
Status: DISCONTINUED | OUTPATIENT
Start: 2021-10-17 | End: 2021-10-17 | Stop reason: HOSPADM

## 2021-10-16 RX ORDER — KETOROLAC TROMETHAMINE 30 MG/ML
30 INJECTION, SOLUTION INTRAMUSCULAR; INTRAVENOUS EVERY 6 HOURS PRN
Status: DISCONTINUED | OUTPATIENT
Start: 2021-10-16 | End: 2021-10-17 | Stop reason: HOSPADM

## 2021-10-16 RX ADMIN — SIMETHICONE 80 MG: 80 TABLET, CHEWABLE ORAL at 09:11

## 2021-10-16 RX ADMIN — KETOROLAC TROMETHAMINE 30 MG: 30 INJECTION, SOLUTION INTRAMUSCULAR; INTRAVENOUS at 05:46

## 2021-10-16 RX ADMIN — IBUPROFEN 600 MG: 600 TABLET ORAL at 21:39

## 2021-10-16 RX ADMIN — ONDANSETRON HYDROCHLORIDE 4 MG: 2 SOLUTION INTRAMUSCULAR; INTRAVENOUS at 02:44

## 2021-10-16 RX ADMIN — IBUPROFEN 600 MG: 600 TABLET ORAL at 13:36

## 2021-10-16 RX ADMIN — PRENATAL VIT W/ FE FUMARATE-FA TAB 27-0.8 MG 1 TABLET: 27-0.8 TAB at 09:11

## 2021-10-16 RX ADMIN — OXYCODONE HYDROCHLORIDE AND ACETAMINOPHEN 1 TABLET: 7.5; 325 TABLET ORAL at 17:57

## 2021-10-16 RX ADMIN — OXYCODONE HYDROCHLORIDE AND ACETAMINOPHEN 1 TABLET: 7.5; 325 TABLET ORAL at 13:36

## 2021-10-16 RX ADMIN — SODIUM CHLORIDE, POTASSIUM CHLORIDE, SODIUM LACTATE AND CALCIUM CHLORIDE 125 ML/HR: 600; 310; 30; 20 INJECTION, SOLUTION INTRAVENOUS at 01:25

## 2021-10-16 NOTE — OP NOTE
[facility]  Sabrina HODGSON  : 2000  MRN: [id number]  [facility]: [id number]  Date: 10/15/2021    Operative Note     SECTION REPEAT      Pre-op Diagnosis:   Pregnancy at 39 weeks 4 days gestational age  Previous  section   Post-op Diagnosis:   Same   Procedure: Procedure(s):   SECTION REPEAT   Surgeon: Surgeon(s):  George Fried MD       Anesthesia:  Spinal     Estimated Blood Loss: 600   mLs   Fluids: 1700   mLs   UOP: 100   mLs   Drains: Peacock catheter   ABx: Kefzol     Specimens:  None   Findings: Normal uterus, tubes, and right ovary.  The left adnexa is absent.   Complications: None       INDICATION: Sabrina HODGSON is a 21 y.o. female who presents at 39 weeks with a history of a previous .  She desires repeat  delivery.     PROCEDURE: After informed consent was obtained, the patient was taken to the operating room where spinal anesthesia was administered. Time out procedure was completed and perioperative antibiotics were administered. She was placed in the supine position with leftward tilt and her abdomen was prepped and draped in normal sterile fashion after a Peacock catheter was placed by nursing staff.   After confirming adequate anesthesia, a Pfannenstiel incision was made with a scalpel.  This was carried down sharply to the underlying fascia which was incised in the midline.  The fascial incision was extended laterally with Harris scissors.  The fascial edges were then elevated and the underlying rectus muscles dissected off with sharp technique.  The rectus muscles were sharply  in the midline and the underlying peritoneum identified and entered sharply.  The peritoneal incision was then extended and an Andrea-O retractor inserted, taking care to avoid entrapping the bowel.  The vesicouterine peritoneum was sharply incised with Metzenbaum scissors to create a bladder flap.  A low transverse uterine incision was made with a clean scalpel.  The  uterine incision was bluntly extended and amniotomy was performed returning clear fluid.  The head of the infant was delivered through the incision without difficulty and the remainder of the infant delivered with fundal pressure.  The infant was vigorous on the field, the cord was clamped and cut, and the infant handed off to waiting nursery nurse.  The placenta was then expressed.  The uterus was repaired in situ and cleared of clot and debris with a moist laparotomy sponge.  The uterine incision was closed with a running locked suture of 0 Monocryl.  A second imbricating layer of 0 Monocryl was placed for reinforcement.  The uterine incision was hemostatic.  The parietal peritoneum was then closed with a running suture of 2-0 Vicryl Rapide.  The subfascial spaces and rectus muscles were inspected with hemostasis noted.  The fascia was then closed with a running suture of 0 Vicryl.  The subcutaneous tissues were irrigated and made hemostatic with Bovie cautery.  The subcutaneous tissues were reapproximated with interrupted sutures of 2-0 plain gut.  The skin was closed with a subcuticular stitch of 3-0 Vicryl Rapide and reinforced with Steri-Strips.  A sterile dressing was then applied.    After expressing the uterus the patient was transitioned to the stretcher and taken to the recovery room in stable condition.  She tolerated the procedure well without complications.  All sponge, needle, and instrument counts were correct ×3 per the OR staff.    George Fried MD   10/15/2021  21:00 CDT

## 2021-10-16 NOTE — PLAN OF CARE
Goal Outcome Evaluation:           Progress: improving  Outcome Summary: VSS, FF/ML/UU,scant lochia, PO pain meds controlling pain, IV removed, pt showered this shift, binder in use, ambulating and voiding independently, using telephone  at bedside, SO supportive at bedside, breastfeeding and bonding well with infant.

## 2021-10-16 NOTE — PROGRESS NOTES
George Fried MD  [facility]  [address]  [place] [address]  Office [phone]  Fax [phone]      [facility]   PROGRESS NOTE    Post-Op Day 1 S/P   Subjective     Patient reports:  Pain is well controlled with narcotic analgesics including oxycodone/acetaminophen (Percocet, Tylox).  She is ambulating. Tolerating diet. Voiding - without difficulty; flatus reported..  Vaginal bleeding is as much as expected.      Objective      Vitals: Vital Signs Range for the last 24 hours  Temperature: Temp:  [96.4 °F (35.8 °C)-98.9 °F (37.2 °C)] 98.2 °F (36.8 °C)   Temp Source: Temp src: Oral   BP: BP: ()/(57-90) 109/63   Pulse: Heart Rate:  [] 80   Respirations: Resp:  [16-24] 18   SPO2: SpO2:  [98 %-100 %] 98 %   O2 Amount (l/min):     O2 Devices Device (Oxygen Therapy): room air   Weight: Weight:  [64.5 kg (142 lb 3.2 oz)] 64.5 kg (142 lb 3.2 oz)            Physical Exam    Lungs clear to auscultation bilaterally   Abdomen Soft, approp tender   Incision  Dressing c/d/i   Extremities edema none and Homans sign is negative, no sign of DVT     I reviewed the patient's new clinical results.  Lab Results (last 24 hours)     Procedure Component Value Units Date/Time    CBC & Differential [[id number]]  (Abnormal) Collected: 10/16/21 0826    Specimen: Blood Updated: 10/16/21 0845    Narrative:      The following orders were created for panel order CBC & Differential.  Procedure                               Abnormality         Status                     ---------                               -----------         ------                     CBC Auto Differential[[id number]]        Abnormal            Final result                 Please view results for these tests on the individual orders.    CBC Auto Differential [[id number]]  (Abnormal) Collected: 10/16/21 0826    Specimen: Blood Updated: 10/16/21 0845     WBC 9.35 10*3/mm3      RBC 3.08 10*6/mm3      Hemoglobin 8.3 g/dL       Hematocrit 26.3 %      MCV 85.4 fL      MCH 26.9 pg      MCHC 31.6 g/dL      RDW 15.3 %      RDW-SD 46.3 fl      MPV 10.1 fL      Platelets 292 10*3/mm3      Neutrophil % 79.5 %      Lymphocyte % 15.6 %      Monocyte % 4.3 %      Eosinophil % 0.1 %      Basophil % 0.3 %      Immature Grans % 0.2 %      Neutrophils, Absolute 7.43 10*3/mm3      Lymphocytes, Absolute 1.46 10*3/mm3      Monocytes, Absolute 0.40 10*3/mm3      Eosinophils, Absolute 0.01 10*3/mm3      Basophils, Absolute 0.03 10*3/mm3      Immature Grans, Absolute 0.02 10*3/mm3      nRBC 0.0 /100 WBC     COVID PRE-OP / PRE-PROCEDURE SCREENING ORDER (NO ISOLATION) - Swab, Nasal Cavity [[id number]]  (Normal) Collected: 10/15/21 1631    Specimen: Swab from Nasal Cavity Updated: 10/15/21 1732    Narrative:      The following orders were created for panel order COVID PRE-OP / PRE-PROCEDURE SCREENING ORDER (NO ISOLATION) - Swab, Nasal Cavity.  Procedure                               Abnormality         Status                     ---------                               -----------         ------                     COVID-19,[employer] IN-SARA...[[id number]]  Normal              Final result                 Please view results for these tests on the individual orders.    COVID-19,[employer] IN-HOUSE,Nasal Swab No Transport Media 3-4 HR TAT - Swab, Nasal Cavity [[id number]]  (Normal) Collected: 10/15/21 1631    Specimen: Swab from Nasal Cavity Updated: 10/15/21 1732     COVID19 Not Detected    Narrative:      Fact sheet for providers: https://www.fda.gov/media/274443/download     Fact sheet for patients: https://www.fda.gov/media/816197/download    Test performed by PCR.    Consider negative results in combination with clinical observations, patient history, and epidemiological information.  Fact sheet for providers: https://www.fda.gov/media/165930/download     Fact sheet for patients: https://www.fda.gov/media/731438/download    Test performed by PCR.    Consider  negative results in combination with clinical observations, patient history, and epidemiological information.    CBC (No Diff) [[id number]]  (Abnormal) Collected: 10/15/21 1639    Specimen: Blood Updated: 10/15/21 1647     WBC 6.58 10*3/mm3      RBC 3.70 10*6/mm3      Hemoglobin 10.0 g/dL      Hematocrit 31.4 %      MCV 84.9 fL      MCH 27.0 pg      MCHC 31.8 g/dL      RDW 15.3 %      RDW-SD 47.1 fl      MPV 10.2 fL      Platelets 350 10*3/mm3           External Prenatal Results     Pregnancy Outside Results - Transcribed From Office Records - See Scanned Records For Details     Test Value Date Time    ABO  A  10/15/21 1639    Rh  Positive  10/15/21 1639    Antibody Screen  Negative  10/15/21 1639       Negative  06/24/21 1030    Varicella IgG       Rubella  1.16 index 06/24/21 1030    Hgb  8.3 g/dL 10/16/21 0826       10.0 g/dL 10/15/21 1639       11.1 g/dL 06/24/21 1030    Hct  26.3 % 10/16/21 0826       31.4 % 10/15/21 1639       34.7 % 06/24/21 1030    Glucose Fasting GTT  85 mg/dL 09/16/21 0853    Glucose Tolerance Test 1 hour  134 mg/dL 09/16/21 0853    Glucose Tolerance Test 3 hour  116 mg/dL 09/16/21 0853    Gonorrhea (discrete)  Negative  09/27/21 1359       Negative  06/24/21 1030    Chlamydia (discrete)  Negative  09/27/21 1359       Positive  06/24/21 1030    RPR  Non Reactive  06/24/21 1030    VDRL       Syphilis Antibody       HBsAg  Negative  06/24/21 1030    Herpes Simplex Virus PCR       Herpes Simplex VIrus Culture       HIV  Non Reactive  06/24/21 1030    Hep C RNA Quant PCR       Hep C Antibody       AFP       Group B Strep       GBS Susceptibility to Clindamycin       GBS Susceptibility to Erythromycin       Fetal Fibronectin       Genetic Testing, Maternal Blood             Drug Screening     Test Value Date Time    Urine Drug Screen       Amphetamine Screen       Barbiturate Screen       Benzodiazepine Screen       Methadone Screen       Phencyclidine Screen       Opiates Screen       THC  Screen       Cocaine Screen       Propoxyphene Screen       Buprenorphine Screen       Methamphetamine Screen       Oxycodone Screen       Tricyclic Antidepressants Screen             Legend    ^: Historical                        Assessment/Plan        Status post repeat low transverse  section    Previous  delivery, antepartum      Assessment:    Sabrina HODGSON is Day 1  post-partum  , Low Transverse   .       Plan:  remove dressing, saline lock IV fluids, advance diet  normal diet as tolerated and continue post op care.        George Fried MD  10/16/2021  09:36 CDT

## 2021-10-16 NOTE — LACTATION NOTE
Mother's Name: Sabrina Phone #: [phone]  Infant Name: Sachin  : 10/15/21  Gestation: 39w4d  Day of life:  Birth weight: 8-0.4 (3640g) Discharge weight:  Weight Loss:   24 hour Summary of Feeds: 4 BFs  Voids: 5  Stools: 2  Assistive devices (shields, shells, etc): None  Significant Maternal history: , Chlamydia , Ovarian Cyst, C/S, Oophorectomy at age 8  Maternal Concerns: None at this time   Maternal Goal: BF-BF first child for 1.5 years  Mother's Medications: PNV, Pepcid  Breastpump for home: Manual   Ped follow up appt:    Consult completed with use of language line. Patient reported breastfeeding to be going well. Pain denied. Patient breast fed her first child for 1.5 years. Praise provided for breastfeeding success. Discussed milk supply (colostrum/mature milk), signs of effective milk transfer, skin to skin, pumping, engorgement, and feeding plan. Sinhala breastfeeding book provided. All questions answered. Recommneded frequent feedings, skin to skin with infant. Understanding verbalized. Further questions denied.     Instructed mom our lactation team is here for continued support throughout their breastfeeding journey. Our team has encouraged mom to call with any questions or concerns that may arise after discharge.

## 2021-10-16 NOTE — ANESTHESIA PROCEDURE NOTES
Intrathecal Block      Patient location during procedure: OR  Preanesthetic Checklist  Completed: patient identified, site marked, surgical consent, pre-op evaluation, timeout performed, IV checked, risks and benefits discussed and monitors and equipment checked  Intrathecal Block Prep:  Pt Position:sitting  Sterile Tech:sterile barrier, gloves, cap and mask  Prep:chloraprep  Monitoring:blood pressure monitoring, continuous pulse oximetry and EKG  Intrathecal Block Procedure:  Approach:midline  Guidance:landmark technique and palpation technique  Location:L4-L5  Needle Type:Pencan  Needle Gauge:25 G  Placement of Needle Event: cerebrospinal fluid  Fluid Appearance:clear  Post Assessment:  Patient Tolerance:patient tolerated the procedure well with no apparent complications  Complications:no

## 2021-10-16 NOTE — PLAN OF CARE
Goal Outcome Evaluation:  Plan of Care Reviewed With: patient, significant other, friend (friend ())        Progress: improving  Outcome Summary: Pt delivered baby boy via C/S.  VSS.  Bleeding is scant, without odor.  Fundus firm, 2 below umbilicus.  Pt rating pain 3/10, pain medication offered but pt denied.

## 2021-10-16 NOTE — H&P
[facility]  Sabrina HODGSON  : 2000  MRN: [id number]  CSN: [id number]    History and Physical    Subjective   Sabrina HODGSON is a 21 y.o. year old  with an Estimated Date of Delivery: 10/18/21 who presents for  delivery due to previous  section declines .  She is not planning for sterilization at the time of the .    Prenatal care has been with Dr. Denny Fried.  It has been benign, but she does have a history of treatment for chlamydia.    OB History    Para Term  AB Living   2 1 1 0 0 1   SAB IAB Ectopic Molar Multiple Live Births   0 0 0 0 0 1      # Outcome Date GA Lbr Peng/2nd Weight Sex Delivery Anes PTL Lv   2 Current            1 Term 2018   3629 g (8 lb) M CS-Unspec Spinal  JAEL     Past Medical History:   Diagnosis Date   • Ovarian cyst      Past Surgical History:   Procedure Laterality Date   •  SECTION     • OOPHORECTOMY      pt had one ovary removed at age 8 per her report for ovarian cyst       Current Facility-Administered Medications:   •  ceFAZolin in 0.9% normal saline (ANCEF) IVPB solution 2 g, 2 g, Intravenous, Once, George Fried MD, Held at 10/15/21 1646  •  famotidine (PEPCID) injection 20 mg, 20 mg, Intravenous, Once PRN, Reyes Hutton CRNA  •  lactated ringers bolus 1,000 mL, 1,000 mL, Intravenous, Once, George Fried MD, Held at 10/15/21 1645  •  lactated ringers infusion, 125 mL/hr, Intravenous, Continuous, George Fried MD, Last Rate: 125 mL/hr at 10/15/21 1640, 125 mL/hr at 10/15/21 1640  •  lidocaine PF 1% (XYLOCAINE) injection 5 mL, 5 mL, Intradermal, PRN, George Fried MD  •  Sod Citrate-Citric Acid (BICITRA) solution 15 mL, 15 mL, Oral, Once, Reyes Hutton CRNA  •  sodium chloride 0.9 % flush 10 mL, 10 mL, Intravenous, Q12H, George Fried MD  •  sodium chloride 0.9 % flush 10 mL, 10 mL, Intravenous, PRN, Deepika Friedew L, MD  History reviewed. No pertinent family  "history.    No Known Allergies  Social History    Tobacco Use      Smoking status: Never Smoker      Smokeless tobacco: Never Used    Review of Systems   Constitutional: Negative for fever.   Respiratory: Negative for cough.    Genitourinary: Negative for vaginal bleeding.   Hematological: Does not bruise/bleed easily.         Objective   /81 (BP Location: Left arm, Patient Position: Lying)   Pulse 88   Temp 96.9 °F (36.1 °C) (Temporal)   Resp 18   Ht 152.4 cm (60\")   Wt 64.5 kg (142 lb 3.2 oz)   Breastfeeding No   BMI 27.77 kg/m²   General: well developed; well nourished  no acute distress   Heart: regular rate and rhythm   Lungs: breathing is unlabored   Abdomen: soft, non-tender; no masses     Cervix:   presenting part vertex  Prenatal Labs  Lab Results   Component Value Date    HGB 10.0 (L) 10/15/2021    HEPBSAG Negative 2021    ABO A 10/15/2021    RH Positive 10/15/2021    ABSCRN Negative 10/15/2021    COQ0CHK1 Non Reactive 2021    URINECX Final report (A) 2021       Recent Labs  Lab Results   Component Value Date    HGB 10.0 (L) 10/15/2021    HCT 31.4 (L) 10/15/2021    WBC 6.58 10/15/2021     10/15/2021           Assessment   1. IUP with an Estimated Date of Delivery: 10/18/21  2. Repeat  section for previous  section declines .     Plan   1. Repeat      Risks, benefits, and alternatives to  section were discussed with the patient at  length.  The surgical nature of  section was discussed.  The indications for  section were discussed.  Risks of bleeding, infection, and damage to surrounding organs were reviewed.  Injury to blood vessels, the urinary bladder, the ureter, and the intestines were all reviewed.  Management of these complications were reviewed.    All of the patient's questions were answered to her satisfaction.  She verbalized understanding.  She wished to proceed.     George Fried MD  10/15/2021      "

## 2021-10-16 NOTE — PLAN OF CARE
Goal Outcome Evaluation:  Plan of Care Reviewed With: patient, significant other        Progress: improving  Outcome Summary: VSS, FFMLUU scant to no lochia, No pain meds utilized, binder on, breastfeeding well, will be DTV in AM, bonding well with infant, Needs , SO at bedside and very attentive to patient and infant.

## 2021-10-16 NOTE — ANESTHESIA POSTPROCEDURE EVALUATION
Patient: Sabrina HODGSON    Procedure Summary     Date: 10/15/21 Room / Location: [facility] LABOR DELIVERY 2 /  [facility] LABOR DELIVERY    Anesthesia Start:  Anesthesia Stop:     Procedure:  SECTION REPEAT (N/A Abdomen) Diagnosis:     Surgeons: George Fried MD Provider: Nikita Hester CRNA    Anesthesia Type: regional ASA Status: 2 - Emergent          Anesthesia Type: regional    Vitals  Vitals Value Taken Time   /67 10/16/21 1300   Temp 98.1 °F (36.7 °C) 10/16/21 1300   Pulse 84 10/16/21 1300   Resp 18 10/16/21 1300   SpO2 98 % 10/16/21 1300           Post Anesthesia Care and Evaluation    Patient location during evaluation: floor  Patient participation: complete - patient participated  Level of consciousness: awake, awake and alert and responsive to verbal stimuli  Pain score: 1  Pain management: adequate  Airway patency: patent  Anesthetic complications: No anesthetic complications  PONV Status: none  Cardiovascular status: acceptable and hemodynamically stable  Respiratory status: acceptable and spontaneous ventilation  Hydration status: acceptable  Post Neuraxial Block status: Motor and sensory function returned to baseline and No signs or symptoms of PDPH

## 2021-10-17 VITALS
HEART RATE: 69 BPM | TEMPERATURE: 97.6 F | SYSTOLIC BLOOD PRESSURE: 101 MMHG | OXYGEN SATURATION: 99 % | RESPIRATION RATE: 18 BRPM | DIASTOLIC BLOOD PRESSURE: 60 MMHG | WEIGHT: 142.2 LBS | HEIGHT: 60 IN | BODY MASS INDEX: 27.92 KG/M2

## 2021-10-17 RX ORDER — FERROUS SULFATE 325(65) MG
325 TABLET ORAL
Qty: 30 TABLET | Refills: 2 | Status: SHIPPED | OUTPATIENT
Start: 2021-10-17 | End: 2021-11-11

## 2021-10-17 RX ORDER — OXYCODONE HYDROCHLORIDE AND ACETAMINOPHEN 5; 325 MG/1; MG/1
1 TABLET ORAL EVERY 4 HOURS PRN
Qty: 20 TABLET | Refills: 0 | Status: SHIPPED | OUTPATIENT
Start: 2021-10-17 | End: 2021-10-24

## 2021-10-17 RX ADMIN — PRENATAL VIT W/ FE FUMARATE-FA TAB 27-0.8 MG 1 TABLET: 27-0.8 TAB at 08:56

## 2021-10-17 RX ADMIN — OXYCODONE HYDROCHLORIDE AND ACETAMINOPHEN 1 TABLET: 5; 325 TABLET ORAL at 05:00

## 2021-10-17 NOTE — DISCHARGE SUMMARY
George Fried MD  [facility]  [address]  [place] [address]  Office [phone]  Fax [phone]    Discharge Summary     Anil HODGSON  : 2000  MRN: [id number]  CSN: [id number]    Date of Admission: 10/15/2021   Date of Discharge:  10/17/2021   Delivering Physician: George Freid MD       Admission Diagnosis: 1. Status post repeat low transverse  section [Z98.891]   Discharge Diagnosis: 1. Pregnancy at 39w4d - delivered       Procedures: 1. Repeat  (LTCS)     Hospital Course  See the completed operative report for details regarding antepartum course and delivery.    Her post-operative course was unremarkable.  On POD # 2 she felt like she ready for discharge.  She was evaluated by Dr. Fried who agreed she was able to be discharged to home.  She had no febrile morbidity. She had normal bowel and bladder function and was hemodynamically stable.  Her wound was healing well without obvious signs of infections.    Infant  male  fetus weighing 3640 g (8 lb 0.4 oz)   Apgars -  8 @ 1 minute /  8 @ 5 minutes.    Discharge labs  Lab Results   Component Value Date    WBC 9.35 10/16/2021    HGB 8.3 (L) 10/16/2021    HCT 26.3 (L) 10/16/2021     10/16/2021       Discharge Medications     Discharge Medications      New Medications      Instructions Start Date   ferrous sulfate 325 (65 FE) MG tablet   325 mg, Oral, Daily With Breakfast      oxyCODONE-acetaminophen 5-325 MG per tablet  Commonly known as: PERCOCET   1 tablet, Oral, Every 4 Hours PRN         Continue These Medications      Instructions Start Date   famotidine 20 MG tablet  Commonly known as: Pepcid   20 mg, Oral, 2 Times Daily      PRENATAL VITAMINS PO   Oral             External Prenatal Results     Pregnancy Outside Results - Transcribed From Office Records - See Scanned Records For Details     Test Value Date Time    ABO  A  10/15/21 1639    Rh   Positive  10/15/21 1639    Antibody Screen  Negative  10/15/21 1639       Negative  06/24/21 1030    Varicella IgG       Rubella  1.16 index 06/24/21 1030    Hgb  8.3 g/dL 10/16/21 0826       10.0 g/dL 10/15/21 1639       11.1 g/dL 06/24/21 1030    Hct  26.3 % 10/16/21 0826       31.4 % 10/15/21 1639       34.7 % 06/24/21 1030    Glucose Fasting GTT  85 mg/dL 09/16/21 0853    Glucose Tolerance Test 1 hour  134 mg/dL 09/16/21 0853    Glucose Tolerance Test 3 hour  116 mg/dL 09/16/21 0853    Gonorrhea (discrete)  Negative  09/27/21 1359       Negative  06/24/21 1030    Chlamydia (discrete)  Negative  09/27/21 1359       Positive  06/24/21 1030    RPR  Non Reactive  06/24/21 1030    VDRL       Syphilis Antibody       HBsAg  Negative  06/24/21 1030    Herpes Simplex Virus PCR       Herpes Simplex VIrus Culture       HIV  Non Reactive  06/24/21 1030    Hep C RNA Quant PCR       Hep C Antibody       AFP       Group B Strep       GBS Susceptibility to Clindamycin       GBS Susceptibility to Erythromycin       Fetal Fibronectin       Genetic Testing, Maternal Blood             Drug Screening     Test Value Date Time    Urine Drug Screen       Amphetamine Screen       Barbiturate Screen       Benzodiazepine Screen       Methadone Screen       Phencyclidine Screen       Opiates Screen       THC Screen       Cocaine Screen       Propoxyphene Screen       Buprenorphine Screen       Methamphetamine Screen       Oxycodone Screen       Tricyclic Antidepressants Screen             Legend    ^: Historical                        Discharge Disposition Home or Self Care   Condition on Discharge: good   Follow-up: 2 weeks with Corky Fried MD  10/17/2021

## 2021-10-17 NOTE — PLAN OF CARE
Goal Outcome Evaluation:  Plan of Care Reviewed With: patient, significant other        Progress: improving  Outcome Summary: VSS, FFMLU1 scant lochia, ambulating and voiding, Prn pain meds used with relief, using telephone translater at bedside, SO at bedside, bonding well with infant, EPDs -7, pt has difficulty reading

## 2021-10-17 NOTE — PROGRESS NOTES
George Fried MD  [facility]  [address]  [place] [address]  Office [phone]  Fax [phone]      [facility]   PROGRESS NOTE    Post-Op Day 2 S/P   Subjective     Patient reports:  Pain is well controlled with narcotic analgesics including oxycodone/acetaminophen (Percocet, Tylox).  She is ambulating. Tolerating diet. Voiding - without difficulty; flatus reported..  Vaginal bleeding is as much as expected.      Objective      Vitals: Vital Signs Range for the last 24 hours  Temperature: Temp:  [97.3 °F (36.3 °C)-98.8 °F (37.1 °C)] 97.8 °F (36.6 °C)   Temp Source: Temp src: Temporal   BP: BP: (101-108)/(65-74) 101/67   Pulse: Heart Rate:  [72-89] 72   Respirations: Resp:  [16-18] 16   SPO2: SpO2:  [98 %-100 %] 98 %   O2 Amount (l/min):     O2 Devices Device (Oxygen Therapy): room air   Weight:              Physical Exam    Lungs clear to auscultation bilaterally   Abdomen  soft, appropriately tender   Incision  healing well, no drainage, no swelling, well approximated   Extremities edema none and Homans sign is negative, no sign of DVT     None  Lab Results (last 24 hours)     Procedure Component Value Units Date/Time    CBC & Differential [[id number]]  (Abnormal) Collected: 10/16/21 0826    Specimen: Blood Updated: 10/16/21 0845    Narrative:      The following orders were created for panel order CBC & Differential.  Procedure                               Abnormality         Status                     ---------                               -----------         ------                     CBC Auto Differential[[id number]]        Abnormal            Final result                 Please view results for these tests on the individual orders.    CBC Auto Differential [[id number]]  (Abnormal) Collected: 10/16/21 0826    Specimen: Blood Updated: 10/16/21 0845     WBC 9.35 10*3/mm3      RBC 3.08 10*6/mm3      Hemoglobin 8.3 g/dL      Hematocrit 26.3 %      MCV 85.4 fL      MCH  26.9 pg      MCHC 31.6 g/dL      RDW 15.3 %      RDW-SD 46.3 fl      MPV 10.1 fL      Platelets 292 10*3/mm3      Neutrophil % 79.5 %      Lymphocyte % 15.6 %      Monocyte % 4.3 %      Eosinophil % 0.1 %      Basophil % 0.3 %      Immature Grans % 0.2 %      Neutrophils, Absolute 7.43 10*3/mm3      Lymphocytes, Absolute 1.46 10*3/mm3      Monocytes, Absolute 0.40 10*3/mm3      Eosinophils, Absolute 0.01 10*3/mm3      Basophils, Absolute 0.03 10*3/mm3      Immature Grans, Absolute 0.02 10*3/mm3      nRBC 0.0 /100 WBC           External Prenatal Results     Pregnancy Outside Results - Transcribed From Office Records - See Scanned Records For Details     Test Value Date Time    ABO  A  10/15/21 1639    Rh  Positive  10/15/21 1639    Antibody Screen  Negative  10/15/21 1639       Negative  06/24/21 1030    Varicella IgG       Rubella  1.16 index 06/24/21 1030    Hgb  8.3 g/dL 10/16/21 0826       10.0 g/dL 10/15/21 1639       11.1 g/dL 06/24/21 1030    Hct  26.3 % 10/16/21 0826       31.4 % 10/15/21 1639       34.7 % 06/24/21 1030    Glucose Fasting GTT  85 mg/dL 09/16/21 0853    Glucose Tolerance Test 1 hour  134 mg/dL 09/16/21 0853    Glucose Tolerance Test 3 hour  116 mg/dL 09/16/21 0853    Gonorrhea (discrete)  Negative  09/27/21 1359       Negative  06/24/21 1030    Chlamydia (discrete)  Negative  09/27/21 1359       Positive  06/24/21 1030    RPR  Non Reactive  06/24/21 1030    VDRL       Syphilis Antibody       HBsAg  Negative  06/24/21 1030    Herpes Simplex Virus PCR       Herpes Simplex VIrus Culture       HIV  Non Reactive  06/24/21 1030    Hep C RNA Quant PCR       Hep C Antibody       AFP       Group B Strep       GBS Susceptibility to Clindamycin       GBS Susceptibility to Erythromycin       Fetal Fibronectin       Genetic Testing, Maternal Blood             Drug Screening     Test Value Date Time    Urine Drug Screen       Amphetamine Screen       Barbiturate Screen       Benzodiazepine Screen        Methadone Screen       Phencyclidine Screen       Opiates Screen       THC Screen       Cocaine Screen       Propoxyphene Screen       Buprenorphine Screen       Methamphetamine Screen       Oxycodone Screen       Tricyclic Antidepressants Screen             Legend    ^: Historical                        Assessment/Plan        Status post repeat low transverse  section    Previous  delivery, antepartum      Assessment:    Sabrina HODGSON is Day 2  post-partum  , Low Transverse   .       Plan:  plan for discharge today.        George Fried MD  10/17/2021  08:28 CDT

## 2021-10-29 ENCOUNTER — TELEPHONE (OUTPATIENT)
Dept: OBSTETRICS AND GYNECOLOGY | Facility: CLINIC | Age: 21
End: 2021-10-29

## 2021-11-11 ENCOUNTER — POSTPARTUM VISIT (OUTPATIENT)
Dept: OBSTETRICS AND GYNECOLOGY | Facility: CLINIC | Age: 21
End: 2021-11-11

## 2021-11-11 VITALS
BODY MASS INDEX: 23.56 KG/M2 | HEIGHT: 60 IN | WEIGHT: 120 LBS | SYSTOLIC BLOOD PRESSURE: 100 MMHG | DIASTOLIC BLOOD PRESSURE: 74 MMHG

## 2021-11-11 DIAGNOSIS — Z09 POSTOP CHECK: Primary | ICD-10-CM

## 2021-11-11 PROCEDURE — 99024 POSTOP FOLLOW-UP VISIT: CPT | Performed by: OBSTETRICS & GYNECOLOGY

## 2021-11-11 NOTE — PROGRESS NOTES
"Sabrina HODGSON is a 21 y.o. female here today for incision check after undergoing  on 10/15.  She has been doing well since her discharge from the hospital and denies fevers, significant abdominal pain, nausea, or problems with her incision.  Her infant is bottle-feeding well and she denies postpartum blues.    /74 (BP Location: Left arm, Patient Position: Sitting, Cuff Size: Adult)   Ht 152.4 cm (60\")   Wt 54.4 kg (120 lb)   Breastfeeding No   BMI 23.44 kg/m²   In general pleasant female no acute distress  Abdomen soft and nontender  Her incision is healing well without signs of infection    Assessment: Normal incision check after a     She will continue with light activities and pelvic rest.  She will followup in 3-4 weeks for a postpartum visit and call in the meantime if she has any questions or concerns.    "

## 2022-03-14 ENCOUNTER — OFFICE VISIT (OUTPATIENT)
Dept: OBSTETRICS AND GYNECOLOGY | Facility: CLINIC | Age: 22
End: 2022-03-14

## 2022-03-14 VITALS
DIASTOLIC BLOOD PRESSURE: 80 MMHG | HEIGHT: 60 IN | WEIGHT: 123 LBS | SYSTOLIC BLOOD PRESSURE: 118 MMHG | BODY MASS INDEX: 24.15 KG/M2

## 2022-03-14 DIAGNOSIS — N89.8 VAGINAL DISCHARGE: ICD-10-CM

## 2022-03-14 DIAGNOSIS — O20.0 THREATENED ABORTION: Primary | ICD-10-CM

## 2022-03-14 DIAGNOSIS — Z12.4 SCREENING FOR CERVICAL CANCER: ICD-10-CM

## 2022-03-14 PROBLEM — Z98.891 STATUS POST REPEAT LOW TRANSVERSE CESAREAN SECTION: Status: RESOLVED | Noted: 2021-10-15 | Resolved: 2022-03-14

## 2022-03-14 PROBLEM — O34.219 PREVIOUS CESAREAN DELIVERY, ANTEPARTUM: Status: RESOLVED | Noted: 2021-06-24 | Resolved: 2022-03-14

## 2022-03-14 PROCEDURE — G0123 SCREEN CERV/VAG THIN LAYER: HCPCS | Performed by: OBSTETRICS & GYNECOLOGY

## 2022-03-14 PROCEDURE — 99213 OFFICE O/P EST LOW 20 MIN: CPT | Performed by: OBSTETRICS & GYNECOLOGY

## 2022-03-14 RX ORDER — METRONIDAZOLE 500 MG/1
500 TABLET ORAL 2 TIMES DAILY
Qty: 14 TABLET | Refills: 0 | Status: SHIPPED | OUTPATIENT
Start: 2022-03-14 | End: 2022-03-21

## 2022-03-14 NOTE — PROGRESS NOTES
"Sabrina HODGSON is a 21 y.o. female here today for evaluation of an early pregnancy.  She reports no bleeding or cramping but has had vaginal discharge and pruritus for the past 2 to 3 weeks.  She has never had a Pap smear.    Visit Vitals  /80   Ht 152.4 cm (60\")   Wt 55.8 kg (123 lb)   LMP 2022 (Approximate)   BMI 24.02 kg/m²     Pleasant female no acute distress  Mood and affect normal  Breathing unlabored  Normal external genitalia, there is a small amount of thin white discharge present in the cervix appears normal  A Pap smear was performed    A wet mount performed in the office today shows clue cells but no trichomonads or yeast    A pelvic ultrasound was ordered and performed in the office today.  There is a fetal pole measuring 6 weeks 5 days gestational age but no cardiac activity    Assessment: Threatened , bacterial vaginosis, screening for cervical cancer    I have given her a prescription for Flagyl twice a day for a week.  We will notify her when the Pap smear results return.  She will return to the office in 1 week for repeat pelvic ultrasound to confirm no cardiac activity.   service was used for this visit.  Call with questions or concerns.      "

## 2022-03-17 LAB
GEN CATEG CVX/VAG CYTO-IMP: NORMAL
LAB AP CASE REPORT: NORMAL
LAB AP GYN OTHER FINDINGS: NORMAL
PATH INTERP SPEC-IMP: NORMAL
STAT OF ADQ CVX/VAG CYTO-IMP: NORMAL

## 2022-03-21 ENCOUNTER — OFFICE VISIT (OUTPATIENT)
Dept: OBSTETRICS AND GYNECOLOGY | Facility: CLINIC | Age: 22
End: 2022-03-21

## 2022-03-21 VITALS
HEIGHT: 60 IN | SYSTOLIC BLOOD PRESSURE: 110 MMHG | BODY MASS INDEX: 23.36 KG/M2 | DIASTOLIC BLOOD PRESSURE: 74 MMHG | WEIGHT: 119 LBS

## 2022-03-21 DIAGNOSIS — O02.1 MISSED ABORTION: Primary | ICD-10-CM

## 2022-03-21 DIAGNOSIS — N89.8 VAGINAL DISCHARGE: ICD-10-CM

## 2022-03-21 PROCEDURE — 99214 OFFICE O/P EST MOD 30 MIN: CPT | Performed by: OBSTETRICS & GYNECOLOGY

## 2022-03-21 RX ORDER — METRONIDAZOLE 500 MG/1
500 TABLET ORAL 2 TIMES DAILY
Qty: 14 TABLET | Refills: 0 | Status: SHIPPED | OUTPATIENT
Start: 2022-03-21 | End: 2022-03-28

## 2022-03-21 RX ORDER — SODIUM CHLORIDE 9 MG/ML
100 INJECTION, SOLUTION INTRAVENOUS CONTINUOUS
Status: CANCELLED | OUTPATIENT
Start: 2022-03-21

## 2022-03-21 NOTE — PROGRESS NOTES
"Sabrina HODGSON is a 21 y.o. female here today for follow-up of a potential missed .  At her visit a week ago ultrasound showed a 6-week fetal pole without cardiac activity.  The Pap smear result from last week is normal.  She reports no cramping but has had a small amount of pink discharge.  She also reports that she was unable to fill the prescription for Flagyl and needs it sent to another pharmacy.    Visit completed with the aid of the  service    Visit Vitals  /74   Ht 152.4 cm (60\")   Wt 54 kg (119 lb)   BMI 23.24 kg/m²     Pleasant female no acute distress  Mood and affect normal  Breathing unlabored    A pelvic ultrasound was ordered and performed in the office today.  There is a 6-week fetal pole without cardiac activity.  These findings are unchanged from 1 week ago.    Assessment: Missed     I have resent her prescription for Flagyl.  We discussed treatment options including expectant management, Cytotec, and D&C.  She would like to proceed with D&C.    We discussed a planned surgical procedure of suction D&C.  We discussed surgical risks of bleeding, infection, uterine perforation, and damage to surrounding structures including bowel, bladder, ureters, and other viscera.  All of her questions have been answered and she is scheduled for surgery on 3/23.  Preoperative orders including Covid testing CBC and BMP have been placed.  Call with questions or concerns.   "

## 2022-03-22 ENCOUNTER — PRE-ADMISSION TESTING (OUTPATIENT)
Dept: PREADMISSION TESTING | Facility: HOSPITAL | Age: 22
End: 2022-03-22

## 2022-03-22 ENCOUNTER — LAB (OUTPATIENT)
Dept: LAB | Facility: HOSPITAL | Age: 22
End: 2022-03-22

## 2022-03-22 VITALS
HEART RATE: 71 BPM | WEIGHT: 121.91 LBS | HEIGHT: 62 IN | OXYGEN SATURATION: 100 % | DIASTOLIC BLOOD PRESSURE: 72 MMHG | BODY MASS INDEX: 22.43 KG/M2 | SYSTOLIC BLOOD PRESSURE: 119 MMHG | RESPIRATION RATE: 18 BRPM

## 2022-03-22 DIAGNOSIS — O02.1 MISSED ABORTION: ICD-10-CM

## 2022-03-22 LAB
ANION GAP SERPL CALCULATED.3IONS-SCNC: 13 MMOL/L (ref 5–15)
BUN SERPL-MCNC: 5 MG/DL (ref 6–20)
BUN/CREAT SERPL: 10.4 (ref 7–25)
CALCIUM SPEC-SCNC: 9.3 MG/DL (ref 8.6–10.5)
CHLORIDE SERPL-SCNC: 102 MMOL/L (ref 98–107)
CO2 SERPL-SCNC: 22 MMOL/L (ref 22–29)
CREAT SERPL-MCNC: 0.48 MG/DL (ref 0.57–1)
DEPRECATED RDW RBC AUTO: 46.5 FL (ref 37–54)
EGFRCR SERPLBLD CKD-EPI 2021: 137.5 ML/MIN/1.73
ERYTHROCYTE [DISTWIDTH] IN BLOOD BY AUTOMATED COUNT: 14.4 % (ref 12.3–15.4)
GLUCOSE SERPL-MCNC: 136 MG/DL (ref 65–99)
HCT VFR BLD AUTO: 38.2 % (ref 34–46.6)
HGB BLD-MCNC: 12.5 G/DL (ref 12–15.9)
MCH RBC QN AUTO: 28.8 PG (ref 26.6–33)
MCHC RBC AUTO-ENTMCNC: 32.7 G/DL (ref 31.5–35.7)
MCV RBC AUTO: 88 FL (ref 79–97)
PLATELET # BLD AUTO: 405 10*3/MM3 (ref 140–450)
PMV BLD AUTO: 9.9 FL (ref 6–12)
POTASSIUM SERPL-SCNC: 3.7 MMOL/L (ref 3.5–5.2)
RBC # BLD AUTO: 4.34 10*6/MM3 (ref 3.77–5.28)
SARS-COV-2 ORF1AB RESP QL NAA+PROBE: NOT DETECTED
SODIUM SERPL-SCNC: 137 MMOL/L (ref 136–145)
WBC NRBC COR # BLD: 6.84 10*3/MM3 (ref 3.4–10.8)

## 2022-03-22 PROCEDURE — 85027 COMPLETE CBC AUTOMATED: CPT

## 2022-03-22 PROCEDURE — C9803 HOPD COVID-19 SPEC COLLECT: HCPCS

## 2022-03-22 PROCEDURE — 36415 COLL VENOUS BLD VENIPUNCTURE: CPT

## 2022-03-22 PROCEDURE — U0004 COV-19 TEST NON-CDC HGH THRU: HCPCS

## 2022-03-22 PROCEDURE — 80048 BASIC METABOLIC PNL TOTAL CA: CPT

## 2022-03-22 NOTE — H&P
"[facility]  Sabrina HODGSON  : 2000  MRN: [id number]  CSN: [id number]    History and Physical    Subjective   Sabrina HODGSON is a 21 y.o. year old  who presents for consultation about surgery due to a 6-week missed .    Past Medical History:   Diagnosis Date   • Ovarian cyst      Past Surgical History:   Procedure Laterality Date   •  SECTION     •  SECTION N/A 10/15/2021    Procedure:  SECTION REPEAT;  Surgeon: George Fried MD;  Location: [facility];  Service: Obstetrics/Gynecology;  Laterality: N/A;   • OOPHORECTOMY      pt had one ovary removed at age 8 per her report for ovarian cyst     Social History    Tobacco Use      Smoking status: Never Smoker      Smokeless tobacco: Never Used      Current Outpatient Medications:   •  metroNIDAZOLE (FLAGYL) 500 MG tablet, Take 1 tablet by mouth 2 (Two) Times a Day for 7 days., Disp: 14 tablet, Rfl: 0    No Known Allergies    History reviewed. No pertinent family history.  Review of Systems   Constitutional: Negative for unexpected weight change.   HENT: Negative for trouble swallowing.    Respiratory: Negative for shortness of breath.    Cardiovascular: Negative for chest pain.   Musculoskeletal: Negative for neck stiffness.   Neurological: Negative for seizures.   Hematological: Does not bruise/bleed easily.         Objective   /74   Ht 152.4 cm (60\")   Wt 54 kg (119 lb)   BMI 23.24 kg/m²     Physical Exam   Physical Exam  Vitals reviewed.   Constitutional:       Appearance: She is well-developed.   HENT:      Head: Normocephalic and atraumatic.   Eyes:      General: No scleral icterus.  Neck:      Trachea: No tracheal deviation.   Cardiovascular:      Rate and Rhythm: Normal rate and regular rhythm.   Pulmonary:      Effort: Pulmonary effort is normal.      Breath sounds: Normal breath sounds.   Abdominal:      General: Bowel sounds are normal. There is no distension.      Palpations: Abdomen " is soft.      Tenderness: There is no abdominal tenderness.   Genitourinary:     Exam position: Supine.      Labia:         Right: No lesion.         Left: No lesion.       Vagina: No vaginal discharge or bleeding.      Cervix: No cervical motion tenderness.      Uterus: Not enlarged, not fixed and not tender.       Adnexa:         Right: No mass.          Left: No mass.        Rectum: No external hemorrhoid.   Skin:     General: Skin is warm and dry.   Neurological:      Mental Status: She is alert and oriented to person, place, and time.         Labs  Lab Results   Component Value Date     10/16/2021    HGB 8.3 (L) 10/16/2021    HCT 26.3 (L) 10/16/2021    WBC 9.35 10/16/2021        Assessment & Plan    Diagnoses and all orders for this visit:    1. Missed  (Primary)  -     Case Request  -     COVID PRE-OP / PRE-PROCEDURE SCREENING ORDER (NO ISOLATION) - Swab, Nasopharynx; Future  -     CBC (No Diff); Future  -     Basic Metabolic Panel; Future    2. Vaginal discharge  -     metroNIDAZOLE (FLAGYL) 500 MG tablet; Take 1 tablet by mouth 2 (Two) Times a Day for 7 days.  Dispense: 14 tablet; Refill: 0    Other orders  -     Follow Anesthesia Guidelines / Standing Orders; Future  -     Provide NPO Instructions to Patient; Future    Risks, benefits, and alternatives of a D&C with hysteroscopy were discussed with the patient in detail. Intraoperative risks of bleeding and damage to surrounding organs, including but not limited to intestine, bladder and ureter, were explained. Management of these were also explained. Postoperative complications such as infection, pneumonia, DVT, and bleeding were explained. The importance of compliance with postoperative restrictions was discussed.    Uterine perforation was discussed with the patient at length.     All of the patient's questions were answered to her satisfaction. She was encouraged to return for an additional appointment if she had further questions. She  verbalized understanding of the above and wished to proceed with the outlined plan.      George Fried MD  3/22/2022  07:57 CDT

## 2022-03-22 NOTE — H&P (VIEW-ONLY)
"[facility]  Sabrina HODGSON  : 2000  MRN: [id number]  CSN: [id number]    History and Physical    Subjective   Sabrina HODGSON is a 21 y.o. year old  who presents for consultation about surgery due to a 6-week missed .    Past Medical History:   Diagnosis Date   • Ovarian cyst      Past Surgical History:   Procedure Laterality Date   •  SECTION     •  SECTION N/A 10/15/2021    Procedure:  SECTION REPEAT;  Surgeon: George Fried MD;  Location: [facility];  Service: Obstetrics/Gynecology;  Laterality: N/A;   • OOPHORECTOMY      pt had one ovary removed at age 8 per her report for ovarian cyst     Social History    Tobacco Use      Smoking status: Never Smoker      Smokeless tobacco: Never Used      Current Outpatient Medications:   •  metroNIDAZOLE (FLAGYL) 500 MG tablet, Take 1 tablet by mouth 2 (Two) Times a Day for 7 days., Disp: 14 tablet, Rfl: 0    No Known Allergies    History reviewed. No pertinent family history.  Review of Systems   Constitutional: Negative for unexpected weight change.   HENT: Negative for trouble swallowing.    Respiratory: Negative for shortness of breath.    Cardiovascular: Negative for chest pain.   Musculoskeletal: Negative for neck stiffness.   Neurological: Negative for seizures.   Hematological: Does not bruise/bleed easily.         Objective   /74   Ht 152.4 cm (60\")   Wt 54 kg (119 lb)   BMI 23.24 kg/m²     Physical Exam   Physical Exam  Vitals reviewed.   Constitutional:       Appearance: She is well-developed.   HENT:      Head: Normocephalic and atraumatic.   Eyes:      General: No scleral icterus.  Neck:      Trachea: No tracheal deviation.   Cardiovascular:      Rate and Rhythm: Normal rate and regular rhythm.   Pulmonary:      Effort: Pulmonary effort is normal.      Breath sounds: Normal breath sounds.   Abdominal:      General: Bowel sounds are normal. There is no distension.      Palpations: Abdomen " is soft.      Tenderness: There is no abdominal tenderness.   Genitourinary:     Exam position: Supine.      Labia:         Right: No lesion.         Left: No lesion.       Vagina: No vaginal discharge or bleeding.      Cervix: No cervical motion tenderness.      Uterus: Not enlarged, not fixed and not tender.       Adnexa:         Right: No mass.          Left: No mass.        Rectum: No external hemorrhoid.   Skin:     General: Skin is warm and dry.   Neurological:      Mental Status: She is alert and oriented to person, place, and time.         Labs  Lab Results   Component Value Date     10/16/2021    HGB 8.3 (L) 10/16/2021    HCT 26.3 (L) 10/16/2021    WBC 9.35 10/16/2021        Assessment & Plan    Diagnoses and all orders for this visit:    1. Missed  (Primary)  -     Case Request  -     COVID PRE-OP / PRE-PROCEDURE SCREENING ORDER (NO ISOLATION) - Swab, Nasopharynx; Future  -     CBC (No Diff); Future  -     Basic Metabolic Panel; Future    2. Vaginal discharge  -     metroNIDAZOLE (FLAGYL) 500 MG tablet; Take 1 tablet by mouth 2 (Two) Times a Day for 7 days.  Dispense: 14 tablet; Refill: 0    Other orders  -     Follow Anesthesia Guidelines / Standing Orders; Future  -     Provide NPO Instructions to Patient; Future    Risks, benefits, and alternatives of a D&C with hysteroscopy were discussed with the patient in detail. Intraoperative risks of bleeding and damage to surrounding organs, including but not limited to intestine, bladder and ureter, were explained. Management of these were also explained. Postoperative complications such as infection, pneumonia, DVT, and bleeding were explained. The importance of compliance with postoperative restrictions was discussed.    Uterine perforation was discussed with the patient at length.     All of the patient's questions were answered to her satisfaction. She was encouraged to return for an additional appointment if she had further questions. She  verbalized understanding of the above and wished to proceed with the outlined plan.      George Fried MD  3/22/2022  07:57 CDT

## 2022-03-22 NOTE — DISCHARGE INSTRUCTIONS
Before you come to the hospital      Do not eat, drink, smoke, or chew gum after midnight the night before surgery. This includes no mints.    Arrival time: AS DIRECTED BY OFFICE     Only one family member or friend will be allowed per patient      YOU MAY TAKE THE FOLLOWING MEDICATION(S) THE MORNING OF SURGERY WITH A SIP OF WATER: ***none          ALL OTHER HOME MEDICATION CHECK WITH YOUR PHYSICIAN                            (especially if you are taking diabetes medicines or blood thinners)     Do not take any Erectile Dysfunction medications (EX: CIALIS, VIAGRA) 24 hours prior to surgery      If you were given and instructed to use a germ- killing soap, use as directed the night before surgery and the morning of surgery before coming to the hospital.                 MANAGING PAIN AFTER SURGERY    We know you are probably wondering what your pain will be like after surgery.  Following surgery it is unrealistic to expect you will not have pain.   Pain is how our bodies let us know that something is wrong or cautions us to be careful.  That said, our goal is to make your pain tolerable.    Methods we may use to treat your pain include (oral or IV medications, PCAs, epidurals, nerve blocks, etc.)   While some procedures require IV pain medications for a short time after surgery, transitioning to pain medications by mouth allows for better management of pain.   Your nurse will encourage you to take oral pain medications whenever possible.  IV medications work almost immediately, but only last a short while.  Taking medications by mouth allows for a more constant level of medication in your blood stream for a longer period of time.      Once your pain is out of control it is harder to get back under control.  It is important you are aware when your next dose of pain medication is due.  If you are admitted, your nurse may write the time of your next dose on the white board in your room to help you remember.      We are  interested in your pain and encourage you to inform us about aggravating factors during your visit.   Many times a simple repositioning every few hours can make a big difference.    If your physician says it is okay, do not let your pain prevent you from getting out of bed. Be sure to call your nurse for assistance prior to getting up so you do not fall.      Before surgery, please decide your tolerable pain goal.  These faces help describe the pain ratings we use on a 0-10 scale.   Be prepared to tell us your goal and whether or not you take pain or anxiety medications at home.

## 2022-03-23 ENCOUNTER — ANESTHESIA EVENT (OUTPATIENT)
Dept: PERIOP | Facility: HOSPITAL | Age: 22
End: 2022-03-23

## 2022-03-23 ENCOUNTER — HOSPITAL ENCOUNTER (OUTPATIENT)
Facility: HOSPITAL | Age: 22
Setting detail: HOSPITAL OUTPATIENT SURGERY
Discharge: HOME | End: 2022-03-23
Attending: OBSTETRICS & GYNECOLOGY | Admitting: OBSTETRICS & GYNECOLOGY

## 2022-03-23 ENCOUNTER — ANESTHESIA (OUTPATIENT)
Dept: PERIOP | Facility: HOSPITAL | Age: 22
End: 2022-03-23

## 2022-03-23 VITALS
SYSTOLIC BLOOD PRESSURE: 100 MMHG | RESPIRATION RATE: 18 BRPM | DIASTOLIC BLOOD PRESSURE: 62 MMHG | OXYGEN SATURATION: 100 % | HEART RATE: 87 BPM | TEMPERATURE: 98 F

## 2022-03-23 DIAGNOSIS — O02.1 MISSED ABORTION: ICD-10-CM

## 2022-03-23 LAB
ABO GROUP BLD: NORMAL
BLD GP AB SCN SERPL QL: NEGATIVE
RH BLD: POSITIVE
T&S EXPIRATION DATE: NORMAL

## 2022-03-23 PROCEDURE — 86850 RBC ANTIBODY SCREEN: CPT | Performed by: OBSTETRICS & GYNECOLOGY

## 2022-03-23 PROCEDURE — 86901 BLOOD TYPING SEROLOGIC RH(D): CPT | Performed by: OBSTETRICS & GYNECOLOGY

## 2022-03-23 PROCEDURE — 25010000002 FENTANYL CITRATE (PF) 100 MCG/2ML SOLUTION: Performed by: NURSE ANESTHETIST, CERTIFIED REGISTERED

## 2022-03-23 PROCEDURE — 25010000002 DEXAMETHASONE PER 1 MG: Performed by: NURSE ANESTHETIST, CERTIFIED REGISTERED

## 2022-03-23 PROCEDURE — 88305 TISSUE EXAM BY PATHOLOGIST: CPT | Performed by: OBSTETRICS & GYNECOLOGY

## 2022-03-23 PROCEDURE — 25010000002 ONDANSETRON PER 1 MG: Performed by: NURSE ANESTHETIST, CERTIFIED REGISTERED

## 2022-03-23 PROCEDURE — 59820 CARE OF MISCARRIAGE: CPT | Performed by: OBSTETRICS & GYNECOLOGY

## 2022-03-23 PROCEDURE — 25010000002 MIDAZOLAM PER 1 MG: Performed by: ANESTHESIOLOGY

## 2022-03-23 PROCEDURE — 86900 BLOOD TYPING SEROLOGIC ABO: CPT | Performed by: OBSTETRICS & GYNECOLOGY

## 2022-03-23 PROCEDURE — 25010000002 PROPOFOL 10 MG/ML EMULSION: Performed by: NURSE ANESTHETIST, CERTIFIED REGISTERED

## 2022-03-23 RX ORDER — LIDOCAINE HYDROCHLORIDE 10 MG/ML
0.5 INJECTION, SOLUTION EPIDURAL; INFILTRATION; INTRACAUDAL; PERINEURAL ONCE AS NEEDED
Status: DISCONTINUED | OUTPATIENT
Start: 2022-03-23 | End: 2022-03-23 | Stop reason: SDUPTHER

## 2022-03-23 RX ORDER — SODIUM CHLORIDE 0.9 % (FLUSH) 0.9 %
3-10 SYRINGE (ML) INJECTION AS NEEDED
Status: DISCONTINUED | OUTPATIENT
Start: 2022-03-23 | End: 2022-03-23 | Stop reason: HOSPADM

## 2022-03-23 RX ORDER — EPHEDRINE SULFATE 50 MG/ML
INJECTION, SOLUTION INTRAVENOUS AS NEEDED
Status: DISCONTINUED | OUTPATIENT
Start: 2022-03-23 | End: 2022-03-23 | Stop reason: SURG

## 2022-03-23 RX ORDER — HYDROCODONE BITARTRATE AND ACETAMINOPHEN 5; 325 MG/1; MG/1
1 TABLET ORAL ONCE AS NEEDED
Status: DISCONTINUED | OUTPATIENT
Start: 2022-03-23 | End: 2022-03-23 | Stop reason: HOSPADM

## 2022-03-23 RX ORDER — MIDAZOLAM HYDROCHLORIDE 1 MG/ML
1 INJECTION INTRAMUSCULAR; INTRAVENOUS
Status: COMPLETED | OUTPATIENT
Start: 2022-03-23 | End: 2022-03-23

## 2022-03-23 RX ORDER — SODIUM CHLORIDE, SODIUM LACTATE, POTASSIUM CHLORIDE, CALCIUM CHLORIDE 600; 310; 30; 20 MG/100ML; MG/100ML; MG/100ML; MG/100ML
100 INJECTION, SOLUTION INTRAVENOUS CONTINUOUS
Status: DISCONTINUED | OUTPATIENT
Start: 2022-03-23 | End: 2022-03-23 | Stop reason: HOSPADM

## 2022-03-23 RX ORDER — DEXAMETHASONE SODIUM PHOSPHATE 4 MG/ML
INJECTION, SOLUTION INTRA-ARTICULAR; INTRALESIONAL; INTRAMUSCULAR; INTRAVENOUS; SOFT TISSUE AS NEEDED
Status: DISCONTINUED | OUTPATIENT
Start: 2022-03-23 | End: 2022-03-23 | Stop reason: SURG

## 2022-03-23 RX ORDER — ONDANSETRON 2 MG/ML
INJECTION INTRAMUSCULAR; INTRAVENOUS AS NEEDED
Status: DISCONTINUED | OUTPATIENT
Start: 2022-03-23 | End: 2022-03-23 | Stop reason: SURG

## 2022-03-23 RX ORDER — SODIUM CHLORIDE 0.9 % (FLUSH) 0.9 %
3 SYRINGE (ML) INJECTION AS NEEDED
Status: DISCONTINUED | OUTPATIENT
Start: 2022-03-23 | End: 2022-03-23 | Stop reason: HOSPADM

## 2022-03-23 RX ORDER — LIDOCAINE HYDROCHLORIDE 20 MG/ML
INJECTION, SOLUTION EPIDURAL; INFILTRATION; INTRACAUDAL; PERINEURAL AS NEEDED
Status: DISCONTINUED | OUTPATIENT
Start: 2022-03-23 | End: 2022-03-23 | Stop reason: SURG

## 2022-03-23 RX ORDER — SODIUM CHLORIDE, SODIUM LACTATE, POTASSIUM CHLORIDE, CALCIUM CHLORIDE 600; 310; 30; 20 MG/100ML; MG/100ML; MG/100ML; MG/100ML
1000 INJECTION, SOLUTION INTRAVENOUS CONTINUOUS
Status: DISCONTINUED | OUTPATIENT
Start: 2022-03-23 | End: 2022-03-23 | Stop reason: HOSPADM

## 2022-03-23 RX ORDER — ONDANSETRON 2 MG/ML
4 INJECTION INTRAMUSCULAR; INTRAVENOUS ONCE AS NEEDED
Status: DISCONTINUED | OUTPATIENT
Start: 2022-03-23 | End: 2022-03-23 | Stop reason: HOSPADM

## 2022-03-23 RX ORDER — PROPOFOL 10 MG/ML
VIAL (ML) INTRAVENOUS AS NEEDED
Status: DISCONTINUED | OUTPATIENT
Start: 2022-03-23 | End: 2022-03-23 | Stop reason: SURG

## 2022-03-23 RX ORDER — FENTANYL CITRATE 50 UG/ML
25 INJECTION, SOLUTION INTRAMUSCULAR; INTRAVENOUS
Status: DISCONTINUED | OUTPATIENT
Start: 2022-03-23 | End: 2022-03-23 | Stop reason: HOSPADM

## 2022-03-23 RX ORDER — LABETALOL HYDROCHLORIDE 5 MG/ML
5 INJECTION, SOLUTION INTRAVENOUS
Status: DISCONTINUED | OUTPATIENT
Start: 2022-03-23 | End: 2022-03-23 | Stop reason: HOSPADM

## 2022-03-23 RX ORDER — OXYCODONE AND ACETAMINOPHEN 7.5; 325 MG/1; MG/1
2 TABLET ORAL EVERY 4 HOURS PRN
Status: DISCONTINUED | OUTPATIENT
Start: 2022-03-23 | End: 2022-03-23 | Stop reason: HOSPADM

## 2022-03-23 RX ORDER — SODIUM CHLORIDE 9 MG/ML
100 INJECTION, SOLUTION INTRAVENOUS CONTINUOUS
Status: DISCONTINUED | OUTPATIENT
Start: 2022-03-23 | End: 2022-03-23 | Stop reason: HOSPADM

## 2022-03-23 RX ORDER — FENTANYL CITRATE 50 UG/ML
INJECTION, SOLUTION INTRAMUSCULAR; INTRAVENOUS AS NEEDED
Status: DISCONTINUED | OUTPATIENT
Start: 2022-03-23 | End: 2022-03-23 | Stop reason: SURG

## 2022-03-23 RX ORDER — IBUPROFEN 600 MG/1
600 TABLET ORAL ONCE AS NEEDED
Status: DISCONTINUED | OUTPATIENT
Start: 2022-03-23 | End: 2022-03-23 | Stop reason: HOSPADM

## 2022-03-23 RX ORDER — DROPERIDOL 2.5 MG/ML
0.62 INJECTION, SOLUTION INTRAMUSCULAR; INTRAVENOUS ONCE AS NEEDED
Status: DISCONTINUED | OUTPATIENT
Start: 2022-03-23 | End: 2022-03-23 | Stop reason: HOSPADM

## 2022-03-23 RX ORDER — SODIUM CHLORIDE 0.9 % (FLUSH) 0.9 %
3 SYRINGE (ML) INJECTION EVERY 12 HOURS SCHEDULED
Status: DISCONTINUED | OUTPATIENT
Start: 2022-03-23 | End: 2022-03-23 | Stop reason: HOSPADM

## 2022-03-23 RX ORDER — NALOXONE HCL 0.4 MG/ML
0.4 VIAL (ML) INJECTION AS NEEDED
Status: DISCONTINUED | OUTPATIENT
Start: 2022-03-23 | End: 2022-03-23 | Stop reason: HOSPADM

## 2022-03-23 RX ORDER — OXYCODONE AND ACETAMINOPHEN 10; 325 MG/1; MG/1
1 TABLET ORAL ONCE AS NEEDED
Status: DISCONTINUED | OUTPATIENT
Start: 2022-03-23 | End: 2022-03-23 | Stop reason: HOSPADM

## 2022-03-23 RX ORDER — ACETAMINOPHEN 500 MG
1000 TABLET ORAL ONCE
Status: COMPLETED | OUTPATIENT
Start: 2022-03-23 | End: 2022-03-23

## 2022-03-23 RX ORDER — MISOPROSTOL 200 UG/1
TABLET ORAL AS NEEDED
Status: DISCONTINUED | OUTPATIENT
Start: 2022-03-23 | End: 2022-03-23 | Stop reason: HOSPADM

## 2022-03-23 RX ORDER — HYDROCODONE BITARTRATE AND ACETAMINOPHEN 5; 325 MG/1; MG/1
1 TABLET ORAL EVERY 4 HOURS PRN
Qty: 4 TABLET | Refills: 0 | Status: SHIPPED | OUTPATIENT
Start: 2022-03-23 | End: 2022-04-07

## 2022-03-23 RX ORDER — LIDOCAINE HYDROCHLORIDE 10 MG/ML
0.5 INJECTION, SOLUTION EPIDURAL; INFILTRATION; INTRACAUDAL; PERINEURAL ONCE AS NEEDED
Status: DISCONTINUED | OUTPATIENT
Start: 2022-03-23 | End: 2022-03-23 | Stop reason: HOSPADM

## 2022-03-23 RX ORDER — FLUMAZENIL 0.1 MG/ML
0.2 INJECTION INTRAVENOUS AS NEEDED
Status: DISCONTINUED | OUTPATIENT
Start: 2022-03-23 | End: 2022-03-23 | Stop reason: HOSPADM

## 2022-03-23 RX ADMIN — DEXAMETHASONE SODIUM PHOSPHATE 4 MG: 4 INJECTION, SOLUTION INTRA-ARTICULAR; INTRALESIONAL; INTRAMUSCULAR; INTRAVENOUS; SOFT TISSUE at 09:45

## 2022-03-23 RX ADMIN — EPHEDRINE SULFATE 10 MG: 50 INJECTION INTRAVENOUS at 09:48

## 2022-03-23 RX ADMIN — SODIUM CHLORIDE, POTASSIUM CHLORIDE, SODIUM LACTATE AND CALCIUM CHLORIDE 1000 ML: 600; 310; 30; 20 INJECTION, SOLUTION INTRAVENOUS at 08:29

## 2022-03-23 RX ADMIN — SODIUM CHLORIDE, POTASSIUM CHLORIDE, SODIUM LACTATE AND CALCIUM CHLORIDE 1000 ML: 600; 310; 30; 20 INJECTION, SOLUTION INTRAVENOUS at 11:40

## 2022-03-23 RX ADMIN — FENTANYL CITRATE 50 MCG: 50 INJECTION, SOLUTION INTRAMUSCULAR; INTRAVENOUS at 10:00

## 2022-03-23 RX ADMIN — MIDAZOLAM 1 MG: 1 INJECTION INTRAMUSCULAR; INTRAVENOUS at 09:21

## 2022-03-23 RX ADMIN — ONDANSETRON 4 MG: 2 INJECTION INTRAMUSCULAR; INTRAVENOUS at 09:45

## 2022-03-23 RX ADMIN — MIDAZOLAM 1 MG: 1 INJECTION INTRAMUSCULAR; INTRAVENOUS at 09:06

## 2022-03-23 RX ADMIN — EPHEDRINE SULFATE 5 MG: 50 INJECTION INTRAVENOUS at 09:59

## 2022-03-23 RX ADMIN — FENTANYL CITRATE 50 MCG: 50 INJECTION, SOLUTION INTRAMUSCULAR; INTRAVENOUS at 09:38

## 2022-03-23 RX ADMIN — PROPOFOL 120 MG: 10 INJECTION, EMULSION INTRAVENOUS at 09:38

## 2022-03-23 RX ADMIN — LIDOCAINE HYDROCHLORIDE 60 MG: 20 INJECTION, SOLUTION EPIDURAL; INFILTRATION; INTRACAUDAL; PERINEURAL at 09:38

## 2022-03-23 RX ADMIN — ACETAMINOPHEN 1000 MG: 500 TABLET, FILM COATED ORAL at 09:06

## 2022-03-23 NOTE — ANESTHESIA PROCEDURE NOTES
Airway  Urgency: elective    Date/Time: 3/23/2022 9:40 AM  Airway not difficult    General Information and Staff    Patient location during procedure: OR  CRNA: Candy Smith CRNA    Indications and Patient Condition  Indications for airway management: airway protection    Preoxygenated: yes  Mask difficulty assessment: 1 - vent by mask    Final Airway Details  Final airway type: endotracheal airway      Successful airway: ETT  Cuffed: yes   Successful intubation technique: direct laryngoscopy  Facilitating devices/methods: intubating stylet  Endotracheal tube insertion site: oral  Blade: Hilliard  Blade size: 2  ETT size (mm): 7.5  Cormack-Lehane Classification: grade I - full view of glottis  Placement verified by: chest auscultation and capnometry   Cuff volume (mL): 8  Measured from: lips  ETT/EBT  to lips (cm): 22  Number of attempts at approach: 1    Additional Comments  Atraumatic

## 2022-03-23 NOTE — ANESTHESIA PREPROCEDURE EVALUATION
Anesthesia Evaluation     Patient summary reviewed and Nursing notes reviewed   no history of anesthetic complications:  NPO Solid Status: > 8 hours  NPO Liquid Status: > 8 hours           Airway   Mallampati: I  TM distance: >3 FB  Neck ROM: full  Dental          Pulmonary    (-) not a smoker    ROS comment: covid  + 22  Cardiovascular   Exercise tolerance: good (4-7 METS)        Neuro/Psych  GI/Hepatic/Renal/Endo      Musculoskeletal     Abdominal    Substance History      OB/GYN      Comment: Missed , 6 weeks      Other                        Anesthesia Plan    ASA 2     general     intravenous induction     Anesthetic plan, all risks, benefits, and alternatives have been provided, discussed and informed consent has been obtained with: patient.        CODE STATUS:

## 2022-03-23 NOTE — ANESTHESIA POSTPROCEDURE EVALUATION
Patient: Sabrina HODGSON    Procedure Summary     Date: 22 Room / Location: [facility] OR 08 /  [facility] OR    Anesthesia Start: 934 Anesthesia Stop:     Procedure: DILATATION AND CURETTAGE WITH SUCTION (N/A Vagina) Diagnosis:       Missed       (Missed  [O02.1])    Surgeons: George Fried MD Provider: Candy Smith CRNA    Anesthesia Type: general ASA Status: 2          Anesthesia Type: general    Vitals  Vitals Value Taken Time   /75 22 1031   Temp 98 °F (36.7 °C) 22 1034   Pulse 101 22 1034   Resp 15 22 1034   SpO2 100 % 22 1034   Vitals shown include unvalidated device data.        Post Anesthesia Care and Evaluation    Patient location during evaluation: PHASE II  Patient participation: complete - patient participated  Level of consciousness: awake and awake and alert  Pain score: 0  Pain management: adequate  Airway patency: patent  Anesthetic complications: No anesthetic complications  PONV Status: none  Cardiovascular status: acceptable  Respiratory status: acceptable  Hydration status: acceptable    Comments: Patient discharged according to acceptable Abdifatah score per RN assessment. See nursing records for further information.     Blood pressure 116/74, pulse 85, temperature 98 °F (36.7 °C), temperature source Temporal, resp. rate 20, SpO2 100 %, not currently breastfeeding.

## 2022-03-23 NOTE — ANESTHESIA PROCEDURE NOTES
Airway  Urgency: elective    Date/Time: 3/23/2022 9:40 AM  Airway not difficult    General Information and Staff    Patient location during procedure: OR  CRNA: Candy Smith CRNA    Indications and Patient Condition  Indications for airway management: airway protection    Preoxygenated: yes  Mask difficulty assessment: 1 - vent by mask    Final Airway Details  Final airway type: supraglottic airway      Successful airway: Supreme and unique  Size 3    Number of attempts at approach: 1  Assessment: lips, teeth, and gum same as pre-op    Additional Comments  LMA placed by ANDERSON Barnett Atraumatic.

## 2022-03-23 NOTE — OP NOTE
[facility]  Sabrina HODGSON  : 2000  MRN: [id number]  [facility]: [id number]  Date: 3/23/2022    Operative Note    DILATATION AND CURETTAGE WITH SUCTION      Pre-op Diagnosis:  Missed  [O02.1]   Post-op Diagnosis:  Post-Op Diagnosis Codes:     * Missed  [O02.1]   Procedure: Procedure(s):  DILATATION AND CURETTAGE WITH SUCTION   Surgeon: Surgeon(s):  George Fried MD       Anesthesia: General      Estimated Blood Loss: 400   mls   Fluids: 800   mls   UOP: 200   mls   Drains: None   ABx: None     Specimens:  Products of conception   Findings: 10 week size uterus   Complications: None     INDICATION: Sabrina HODGSON is a 21 y.o. female  with a 6 week missed .  On ultrasound there was no fetal cardiac activity.  She has considered treatment options and has decided on surgery.  PROCEDURE IN DETAIL: After informed consent was obtained, the patient was taken to the operating room, where general anesthesia was administered. She was placed in the lithotomy position in [facility], and her perineum and vagina were prepped and draped in normal sterile fashion. A open sided speculum was placed in the vagina and her bladder drained with a metal catheter. The anterior lip of the cervix was visualized and grasped with a tenaculum.  The cervix was dilated with Hegar dilators to accept a 8 mm curved suction curet.  The curet was gently placed in the uterus at the fundus and the tubing attached.  The suction device was activated and suction allowed to build.  The suction curette was then rotated and slowly withdrawn with tissue noted through the tubing.  The suction was released and the curette reintroduced to the uterine fundus.  The curet was then rotated and withdrawn again noting further removal of tissue.  A third pass in a similar fashion revealed no further tissue.  A medium size sharp curette was then placed in the uterus and a gritty texture was noted in all 4 quadrants.  The sharp  curette was removed and the suction curette passed one more time to remove any clots or debris from the uterine cavity. Bleeding was controlled by uterine massage and 800mcg Cytotec per rectum. The cervix was then observed and no active bleeding was noted.  The tenaculum was removed and the site was made hemostatic with a figure of eight suture of 2-0 chromic.  All instruments were then removed from the vagina.   The patient was then awakened and taken to the recovery room in stable condition.  She tolerated the procedure well and without complications.  All sponge needle and instrument counts were correct times 3 per the OR staff.    George Fried MD   3/23/2022  10:13 CDT

## 2022-03-24 LAB
CYTO UR: NORMAL
LAB AP CASE REPORT: NORMAL
PATH REPORT.FINAL DX SPEC: NORMAL
PATH REPORT.GROSS SPEC: NORMAL

## 2022-04-07 ENCOUNTER — OFFICE VISIT (OUTPATIENT)
Dept: OBSTETRICS AND GYNECOLOGY | Facility: CLINIC | Age: 22
End: 2022-04-07

## 2022-04-07 VITALS
BODY MASS INDEX: 21.71 KG/M2 | WEIGHT: 118 LBS | SYSTOLIC BLOOD PRESSURE: 108 MMHG | HEIGHT: 62 IN | DIASTOLIC BLOOD PRESSURE: 64 MMHG

## 2022-04-07 DIAGNOSIS — Z09 POSTOP CHECK: ICD-10-CM

## 2022-04-07 DIAGNOSIS — Z30.013 ENCOUNTER FOR INITIAL PRESCRIPTION OF INJECTABLE CONTRACEPTIVE: ICD-10-CM

## 2022-04-07 DIAGNOSIS — O02.1 MISSED ABORTION: Primary | ICD-10-CM

## 2022-04-07 PROCEDURE — 99024 POSTOP FOLLOW-UP VISIT: CPT | Performed by: OBSTETRICS & GYNECOLOGY

## 2022-04-07 RX ORDER — MEDROXYPROGESTERONE ACETATE 150 MG/ML
150 INJECTION, SUSPENSION INTRAMUSCULAR
Qty: 1 EACH | Refills: 5 | Status: SHIPPED | OUTPATIENT
Start: 2022-04-07 | End: 2022-04-19 | Stop reason: SDUPTHER

## 2022-04-07 NOTE — PROGRESS NOTES
"Sabrina HODGSON is a 21 y.o. female here today for follow up of a 6 week missed , and a postop visit after undergoing D&C on 3/23.  She has been doing well postoperatively since her procedure and denies any fevers, pain, or vaginal bleeding.  Her pathology report returned showing benign findings.    /64 (BP Location: Left arm, Patient Position: Sitting)   Ht 156.3 cm (61.54\")   Wt 53.5 kg (118 lb)   BMI 21.91 kg/m²    In general pleasant female in no acute distress  Abdomen is soft nontender nondistended    Assessment: Missed , normal postoperative visit after D&C, contraceptive management    She is doing well since her procedure. We have discussed the benign pathology report.  She would like to begin contraception and we reviewed contraceptive options.  She has elected to begin Depo-Provera.  We discussed common side effects including irregular bleeding, weight gain, and a potential 6 to 12-month delay in return to ovulation and regular menstrual cycles upon cessation.  I have given her a prescription for Depo-Provera and she will return for injection.  I have given a handout in Ukrainian on Depo-Provera.  Call with questions or concerns.    This visit was completed with the aid of  service.      "

## 2022-04-19 ENCOUNTER — CLINICAL SUPPORT (OUTPATIENT)
Dept: OBSTETRICS AND GYNECOLOGY | Facility: CLINIC | Age: 22
End: 2022-04-19

## 2022-04-19 DIAGNOSIS — Z30.013 INITIATION OF DEPO PROVERA: Primary | ICD-10-CM

## 2022-04-19 LAB
B-HCG UR QL: NEGATIVE
EXPIRATION DATE: NORMAL
INTERNAL NEGATIVE CONTROL: NEGATIVE
INTERNAL POSITIVE CONTROL: POSITIVE
Lab: NORMAL

## 2022-04-19 PROCEDURE — 96372 THER/PROPH/DIAG INJ SC/IM: CPT | Performed by: OBSTETRICS & GYNECOLOGY

## 2022-04-19 PROCEDURE — 81025 URINE PREGNANCY TEST: CPT | Performed by: OBSTETRICS & GYNECOLOGY

## 2022-04-19 RX ORDER — MEDROXYPROGESTERONE ACETATE 150 MG/ML
150 INJECTION, SUSPENSION INTRAMUSCULAR ONCE
Status: COMPLETED | OUTPATIENT
Start: 2022-04-19 | End: 2022-04-19

## 2022-04-19 RX ADMIN — MEDROXYPROGESTERONE ACETATE 150 MG: 150 INJECTION, SUSPENSION INTRAMUSCULAR at 12:07

## 2022-04-19 NOTE — PROGRESS NOTES
Pt here for Depo Injection, using  services,  Sarbjit ID# [id number], UPT was negative in office today, pt confirmed name and  via  and that she was here to get her Depo injection. Injection was given in left gluteus medius, pt tolerated procedure well.

## 2022-08-05 ENCOUNTER — CLINICAL SUPPORT (OUTPATIENT)
Dept: OBSTETRICS AND GYNECOLOGY | Facility: CLINIC | Age: 22
End: 2022-08-05

## 2022-08-05 DIAGNOSIS — Z30.013 ENCOUNTER FOR INITIAL PRESCRIPTION OF INJECTABLE CONTRACEPTIVE: Primary | ICD-10-CM

## 2022-08-05 PROCEDURE — 96372 THER/PROPH/DIAG INJ SC/IM: CPT | Performed by: OBSTETRICS & GYNECOLOGY

## 2022-08-05 PROCEDURE — 81025 URINE PREGNANCY TEST: CPT | Performed by: OBSTETRICS & GYNECOLOGY

## 2022-08-05 RX ORDER — MEDROXYPROGESTERONE ACETATE 150 MG/ML
150 INJECTION, SUSPENSION INTRAMUSCULAR ONCE
Status: COMPLETED | OUTPATIENT
Start: 2022-08-05 | End: 2022-08-05

## 2022-08-05 RX ADMIN — MEDROXYPROGESTERONE ACETATE 150 MG: 150 INJECTION, SUSPENSION INTRAMUSCULAR at 15:17

## 2022-10-25 ENCOUNTER — HOSPITAL ENCOUNTER (EMERGENCY)
Age: 22
Discharge: HOME | End: 2022-10-25

## 2022-10-25 VITALS
TEMPERATURE: 99 F | HEART RATE: 118 BPM | DIASTOLIC BLOOD PRESSURE: 74 MMHG | RESPIRATION RATE: 18 BRPM | OXYGEN SATURATION: 99 % | SYSTOLIC BLOOD PRESSURE: 110 MMHG

## 2022-10-25 DIAGNOSIS — J10.1 INFLUENZA A: Primary | ICD-10-CM

## 2022-10-25 LAB
ADENOVIRUS BY PCR: NOT DETECTED
BORDETELLA PARAPERTUSSIS BY PCR: NOT DETECTED
BORDETELLA PERTUSSIS BY PCR: NOT DETECTED
CHLAMYDOPHILIA PNEUMONIAE BY PCR: NOT DETECTED
CORONAVIRUS 229E BY PCR: NOT DETECTED
CORONAVIRUS HKU1 BY PCR: NOT DETECTED
CORONAVIRUS NL63 BY PCR: NOT DETECTED
CORONAVIRUS OC43 BY PCR: NOT DETECTED
HUMAN METAPNEUMOVIRUS BY PCR: NOT DETECTED
HUMAN RHINOVIRUS/ENTEROVIRUS BY PCR: NOT DETECTED
INFLUENZA A H3 BY PCR: DETECTED
INFLUENZA B BY PCR: NOT DETECTED
MYCOPLASMA PNEUMONIAE BY PCR: NOT DETECTED
PARAINFLUENZA VIRUS 1 BY PCR: NOT DETECTED
PARAINFLUENZA VIRUS 2 BY PCR: NOT DETECTED
PARAINFLUENZA VIRUS 3 BY PCR: NOT DETECTED
PARAINFLUENZA VIRUS 4 BY PCR: NOT DETECTED
RESPIRATORY SYNCYTIAL VIRUS BY PCR: NOT DETECTED
SARS-COV-2, PCR: NOT DETECTED

## 2022-10-25 PROCEDURE — 0202U NFCT DS 22 TRGT SARS-COV-2: CPT

## 2022-10-25 PROCEDURE — 99283 EMERGENCY DEPT VISIT LOW MDM: CPT

## 2022-10-25 RX ORDER — OSELTAMIVIR PHOSPHATE 75 MG/1
75 CAPSULE ORAL 2 TIMES DAILY
Qty: 10 CAPSULE | Refills: 0 | Status: SHIPPED | OUTPATIENT
Start: 2022-10-25 | End: 2022-10-30

## 2022-10-25 NOTE — Clinical Note
Bello Maicol was seen and treated in our emergency department on 10/25/2022. She may return to work on 10/31/2022. If you have any questions or concerns, please don't hesitate to call.       JULIETTE De Leon

## 2022-10-26 ASSESSMENT — ENCOUNTER SYMPTOMS
ABDOMINAL DISTENTION: 0
PHOTOPHOBIA: 0
APNEA: 0
SHORTNESS OF BREATH: 0
RHINORRHEA: 0
EYE PAIN: 0
EYE DISCHARGE: 0
NAUSEA: 0
COUGH: 1
ABDOMINAL PAIN: 0
COLOR CHANGE: 0
BACK PAIN: 0
SORE THROAT: 0

## 2022-10-26 NOTE — ED PROVIDER NOTES
[facility]  eMERGENCYdEPARTMENT eNCOUnter      Pt Name: Terry Gutierrez  MRN: [id number]  [place] 2000  Date of evaluation: 10/25/2022  Provider:JULIETTE Meyers    CHIEF COMPLAINT       Chief Complaint   Patient presents with    Illness     Cough, fever, body aches          HISTORY OF PRESENT ILLNESS  (Location/Symptom, Timing/Onset, Context/Setting, Quality, Duration, Modifying Factors, Severity.)   Terry Gutierrez is a 20 y.o. female who presents to the emergency department complaints of cough fever body aches she has no known exposure to flu but here with her 2 children also having similar symptoms x1 day I used a  during this encounter the patient denies any shortness of breath no pleurisy room air with no hypoxia concerns. No urinary symptoms. HPI    Nursing Notes were reviewed and I agree. REVIEW OF SYSTEMS    (2-9 systems for level 4, 10 or more for level 5)     Review of Systems   Constitutional:  Positive for chills and fever. Negative for activity change and appetite change. HENT:  Negative for congestion, postnasal drip, rhinorrhea and sore throat. Eyes:  Negative for photophobia, pain, discharge and visual disturbance. Respiratory:  Positive for cough. Negative for apnea and shortness of breath. Cardiovascular:  Negative for chest pain and leg swelling. Gastrointestinal:  Negative for abdominal distention, abdominal pain and nausea. Genitourinary:  Negative for vaginal bleeding. Musculoskeletal:  Positive for myalgias. Negative for arthralgias, back pain, joint swelling, neck pain and neck stiffness. Skin:  Negative for color change and rash. Neurological:  Negative for dizziness, syncope, facial asymmetry and headaches. Hematological:  Negative for adenopathy. Does not bruise/bleed easily. Psychiatric/Behavioral:  Negative for agitation, behavioral problems and confusion.        Except as noted above the remainder of the review of systems was reviewed and negative. PAST MEDICAL HISTORY   History reviewed. No pertinent past medical history. SURGICAL HISTORY     History reviewed. No pertinent surgical history. CURRENT MEDICATIONS       Discharge Medication List as of 10/25/2022  3:16 PM          ALLERGIES     Vicks vaporub [liniments]    FAMILY HISTORY     History reviewed. No pertinent family history. SOCIAL HISTORY       Social History     Socioeconomic History    Marital status: Single     Spouse name: None    Number of children: None    Years of education: None    Highest education level: None   Tobacco Use    Smoking status: Never    Smokeless tobacco: Never   Substance and Sexual Activity    Alcohol use: Never    Drug use: Never       SCREENINGS    [place] Coma Scale  Eye Opening: Spontaneous  Best Verbal Response: Oriented  Best Motor Response: Obeys commands  [place] Coma Scale Score: 15      PHYSICAL EXAM    (up to 7 forlevel 4, 8 or more for level 5)     ED Triage Vitals [10/25/22 1245]   BP Temp Temp Source Heart Rate Resp SpO2 Height Weight   108/72 99.9 °F (37.7 °C) Temporal (!) 120 18 99 % -- --       Physical Exam  Vitals and nursing note reviewed. Constitutional:       General: She is not in acute distress. Appearance: Normal appearance. She is well-developed. She is not diaphoretic. HENT:      Head: Normocephalic and atraumatic. Right Ear: External ear normal.      Left Ear: External ear normal.      Mouth/Throat:      Pharynx: No oropharyngeal exudate. Eyes:      General:         Right eye: No discharge. Left eye: No discharge. Pupils: Pupils are equal, round, and reactive to light. Neck:      Thyroid: No thyromegaly. Cardiovascular:      Rate and Rhythm: Normal rate and regular rhythm. Pulses: Normal pulses. Heart sounds: Normal heart sounds. No murmur heard. No friction rub. Pulmonary:      Effort: Pulmonary effort is normal. No respiratory distress.       Breath sounds: Normal breath sounds. No stridor. No wheezing. Abdominal:      General: Bowel sounds are normal. There is no distension. Palpations: Abdomen is soft. Tenderness: There is no abdominal tenderness. Musculoskeletal:         General: Normal range of motion. Cervical back: Normal range of motion and neck supple. Skin:     General: Skin is warm and dry. Capillary Refill: Capillary refill takes less than 2 seconds. Findings: No rash. Neurological:      General: No focal deficit present. Mental Status: She is alert and oriented to person, place, and time. Mental status is at baseline. Cranial Nerves: No cranial nerve deficit. Sensory: No sensory deficit. Coordination: Coordination normal.   Psychiatric:         Mood and Affect: Mood normal.         Behavior: Behavior normal.         Thought Content: Thought content normal.         Judgment: Judgment normal.         DIAGNOSTIC RESULTS     RADIOLOGY:   Non-plain film images such as CT, Ultrasound and MRI are read by the radiologist. Plain radiographic images are visualized and preliminarilyinterpreted by No att. providers found with the below findings:      Interpretation per the Radiologist below, if available at the time of this note:    No orders to display       LABS:  Labs Reviewed   RESPIRATORY PANEL, MOLECULAR, WITH COVID-19 - Abnormal; Notable for the following components:       Result Value    Influenza A H3 by PCR DETECTED (*)     All other components within normal limits       All other labs were within normal range or notreturned as of this dictation.     RE-ASSESSMENT          EMERGENCY DEPARTMENT COURSE and DIFFERENTIAL DIAGNOSIS/MDM:   Vitals:    Vitals:    10/25/22 1245 10/25/22 1515   BP: 108/72 110/74   Pulse: (!) 120 (!) 118   Resp: 18 18   Temp: 99.9 °F (37.7 °C) 99 °F (37.2 °C)   TempSrc: Temporal    SpO2: 99% 99%         MDM  Plan for Tamiflu patient wants to try this medication she understands risk versus benefits she understands return precautions and preferably would follow-up with the walk-in clinic in 48 hours for reevaluation.  used during this encounter. PROCEDURES:    Procedures      FINAL IMPRESSION      1. Influenza A          DISPOSITION/PLAN   DISPOSITION Decision To Discharge 10/25/2022 03:13:47 PM      PATIENT REFERRED TO:  [facility] EMERGENCY DEPT  Robert Vicknano  [phone]    If symptoms worsen    [place] [id number] [phone]  Schedule an appointment as soon as possible for a visit         DISCHARGE MEDICATIONS:  Discharge Medication List as of 10/25/2022  3:16 PM        START taking these medications    Details   oseltamivir (TAMIFLU) 75 MG capsule Take 1 capsule by mouth 2 times daily for 5 days, Disp-10 capsule, R-0Normal             (Please note that portions of this note were completed with a voice recognition program.  Efforts were made to edit the dictations but occasionallywords are mis-transcribed.)    Elsa Catherine, [address] [place] [address], [address] [place], [place] [address]jazmín Newman  10/26/22 8322

## 2023-01-27 ENCOUNTER — CLINICAL SUPPORT (OUTPATIENT)
Dept: OBSTETRICS AND GYNECOLOGY | Facility: CLINIC | Age: 23
End: 2023-01-27
Payer: MEDICAID

## 2023-01-27 DIAGNOSIS — Z30.42 ENCOUNTER FOR DEPO-PROVERA CONTRACEPTION: Primary | ICD-10-CM

## 2023-01-27 PROCEDURE — 81025 URINE PREGNANCY TEST: CPT | Performed by: OBSTETRICS & GYNECOLOGY

## 2023-01-27 PROCEDURE — 96372 THER/PROPH/DIAG INJ SC/IM: CPT | Performed by: OBSTETRICS & GYNECOLOGY

## 2023-01-27 RX ORDER — MEDROXYPROGESTERONE ACETATE 150 MG/ML
150 INJECTION, SUSPENSION INTRAMUSCULAR ONCE
Status: COMPLETED | OUTPATIENT
Start: 2023-01-27 | End: 2023-01-27

## 2023-01-27 RX ADMIN — MEDROXYPROGESTERONE ACETATE 150 MG: 150 INJECTION, SUSPENSION INTRAMUSCULAR at 14:48

## 2023-01-27 NOTE — PROGRESS NOTES
Patient came for depo shot. Patient was out of her window so I did a pregnancy test on her. It was negative. I verified allergies and I gave the shot in her right deltoid. Patient tolerated shot well and had no questions. Patient was given next shot time frame of April 14th -April 28th.

## 2023-04-26 DIAGNOSIS — Z30.013 ENCOUNTER FOR INITIAL PRESCRIPTION OF INJECTABLE CONTRACEPTIVE: ICD-10-CM

## 2023-04-26 RX ORDER — MEDROXYPROGESTERONE ACETATE 150 MG/ML
INJECTION, SUSPENSION INTRAMUSCULAR
Qty: 1 ML | Refills: 0 | OUTPATIENT
Start: 2023-04-26

## 2023-07-31 ENCOUNTER — TELEPHONE (OUTPATIENT)
Dept: OBSTETRICS AND GYNECOLOGY | Facility: CLINIC | Age: 23
End: 2023-07-31
Payer: MEDICAID

## 2023-08-25 ENCOUNTER — TELEPHONE (OUTPATIENT)
Dept: OBSTETRICS AND GYNECOLOGY | Facility: CLINIC | Age: 23
End: 2023-08-25
Payer: MEDICAID

## 2023-08-25 NOTE — TELEPHONE ENCOUNTER
Pt came to the  asking to be seen. Pt had an  on the phone as she does not speak English. The interpretor states pt was having c/o pelvic pain and irregular bleeding. The pt appeared to have previously been crying however was ambulating on her own. Pt advised through  to be evaluated in the ER for her pain and bleeding. Pt voiced understanding

## 2023-08-29 ENCOUNTER — HOSPITAL ENCOUNTER (EMERGENCY)
Age: 23
Discharge: HOME | End: 2023-08-29
Payer: MEDICAID

## 2023-08-29 ENCOUNTER — APPOINTMENT (OUTPATIENT)
Dept: ULTRASOUND IMAGING | Age: 23
End: 2023-08-29
Payer: MEDICAID

## 2023-08-29 VITALS
HEART RATE: 79 BPM | DIASTOLIC BLOOD PRESSURE: 51 MMHG | SYSTOLIC BLOOD PRESSURE: 91 MMHG | TEMPERATURE: 98 F | RESPIRATION RATE: 20 BRPM | OXYGEN SATURATION: 100 %

## 2023-08-29 DIAGNOSIS — Z3A.01 5 WEEKS GESTATION OF PREGNANCY: Primary | ICD-10-CM

## 2023-08-29 DIAGNOSIS — O20.0 THREATENED MISCARRIAGE: ICD-10-CM

## 2023-08-29 DIAGNOSIS — N93.9 VAGINAL BLEEDING: ICD-10-CM

## 2023-08-29 LAB
ALBUMIN SERPL-MCNC: 4.3 G/DL (ref 3.5–5.2)
ALP SERPL-CCNC: 75 U/L (ref 35–104)
ALT SERPL-CCNC: 8 U/L (ref 5–33)
ANION GAP SERPL CALCULATED.3IONS-SCNC: 10 MMOL/L (ref 7–19)
AST SERPL-CCNC: 12 U/L (ref 5–32)
BACTERIA #/AREA URNS HPF: ABNORMAL /HPF
BASOPHILS # BLD: 0 K/UL (ref 0–0.2)
BASOPHILS NFR BLD: 0.5 % (ref 0–1)
BILIRUB SERPL-MCNC: 0.3 MG/DL (ref 0.2–1.2)
BILIRUB UR QL STRIP: NEGATIVE
BUN SERPL-MCNC: 9 MG/DL (ref 6–20)
CALCIUM SERPL-MCNC: 9.1 MG/DL (ref 8.6–10)
CHLORIDE SERPL-SCNC: 104 MMOL/L (ref 98–111)
CLARITY UR: CLEAR
CO2 SERPL-SCNC: 23 MMOL/L (ref 22–29)
COLOR UR: YELLOW
CREAT SERPL-MCNC: 0.6 MG/DL (ref 0.5–0.9)
EOSINOPHIL # BLD: 0 K/UL (ref 0–0.6)
EOSINOPHIL NFR BLD: 0.4 % (ref 0–5)
ERYTHROCYTE [DISTWIDTH] IN BLOOD BY AUTOMATED COUNT: 13.1 % (ref 11.5–14.5)
GLUCOSE SERPL-MCNC: 125 MG/DL (ref 74–109)
GLUCOSE UR STRIP.AUTO-MCNC: 250 MG/DL
GONADOTROPIN, CHORIONIC (HCG) QUANT: 6378 MIU/ML (ref 0–5.3)
HCG SERPL QL: POSITIVE
HCT VFR BLD AUTO: 36.7 % (ref 37–47)
HGB BLD-MCNC: 12 G/DL (ref 12–16)
HGB UR STRIP.AUTO-MCNC: ABNORMAL MG/L
IMM GRANULOCYTES # BLD: 0 K/UL
KETONES UR STRIP.AUTO-MCNC: ABNORMAL MG/DL
LEUKOCYTE ESTERASE UR QL STRIP.AUTO: ABNORMAL
LYMPHOCYTES # BLD: 1.7 K/UL (ref 1.1–4.5)
LYMPHOCYTES NFR BLD: 21 % (ref 20–40)
MCH RBC QN AUTO: 29.9 PG (ref 27–31)
MCHC RBC AUTO-ENTMCNC: 32.7 G/DL (ref 33–37)
MCV RBC AUTO: 91.3 FL (ref 81–99)
MONOCYTES # BLD: 0.4 K/UL (ref 0–0.9)
MONOCYTES NFR BLD: 5.4 % (ref 0–10)
NEUTROPHILS # BLD: 5.9 K/UL (ref 1.5–7.5)
NEUTS SEG NFR BLD: 72.5 % (ref 50–65)
NITRITE UR QL STRIP.AUTO: NEGATIVE
PH UR STRIP.AUTO: 5.5 [PH] (ref 5–8)
PLATELET # BLD AUTO: 349 K/UL (ref 130–400)
PMV BLD AUTO: 9.7 FL (ref 9.4–12.3)
POTASSIUM SERPL-SCNC: 3.6 MMOL/L (ref 3.5–5)
PROT SERPL-MCNC: 7.3 G/DL (ref 6.6–8.7)
PROT UR STRIP.AUTO-MCNC: NEGATIVE MG/DL
RBC # BLD AUTO: 4.02 M/UL (ref 4.2–5.4)
RBC #/AREA URNS HPF: ABNORMAL /HPF (ref 0–2)
SODIUM SERPL-SCNC: 137 MMOL/L (ref 136–145)
SP GR UR STRIP.AUTO: 1.03 (ref 1–1.03)
SQUAMOUS #/AREA URNS HPF: ABNORMAL /HPF
UROBILINOGEN UR STRIP.AUTO-MCNC: 1 E.U./DL
WBC # BLD AUTO: 8.2 K/UL (ref 4.8–10.8)
WBC #/AREA URNS HPF: ABNORMAL /HPF (ref 0–5)

## 2023-08-29 PROCEDURE — 85025 COMPLETE CBC W/AUTO DIFF WBC: CPT

## 2023-08-29 PROCEDURE — 99284 EMERGENCY DEPT VISIT MOD MDM: CPT

## 2023-08-29 PROCEDURE — 84703 CHORIONIC GONADOTROPIN ASSAY: CPT

## 2023-08-29 PROCEDURE — 80053 COMPREHEN METABOLIC PANEL: CPT

## 2023-08-29 PROCEDURE — 84702 CHORIONIC GONADOTROPIN TEST: CPT

## 2023-08-29 PROCEDURE — 36415 COLL VENOUS BLD VENIPUNCTURE: CPT

## 2023-08-29 PROCEDURE — 2580000003 HC RX 258: Performed by: PHYSICIAN ASSISTANT

## 2023-08-29 PROCEDURE — 81001 URINALYSIS AUTO W/SCOPE: CPT

## 2023-08-29 PROCEDURE — 76817 TRANSVAGINAL US OBSTETRIC: CPT

## 2023-08-29 RX ORDER — 0.9 % SODIUM CHLORIDE 0.9 %
500 INTRAVENOUS SOLUTION INTRAVENOUS ONCE
Status: COMPLETED | OUTPATIENT
Start: 2023-08-29 | End: 2023-08-29

## 2023-08-29 RX ADMIN — SODIUM CHLORIDE 500 ML: 9 INJECTION, SOLUTION INTRAVENOUS at 11:27

## 2023-08-29 ASSESSMENT — ENCOUNTER SYMPTOMS
SHORTNESS OF BREATH: 0
ABDOMINAL DISTENTION: 0
PHOTOPHOBIA: 0
BACK PAIN: 0
COLOR CHANGE: 0
APNEA: 0
ABDOMINAL PAIN: 0
EYE PAIN: 0
RHINORRHEA: 0
EYE DISCHARGE: 0
COUGH: 0
SORE THROAT: 0
NAUSEA: 0

## 2023-08-29 ASSESSMENT — PAIN - FUNCTIONAL ASSESSMENT
PAIN_FUNCTIONAL_ASSESSMENT: NONE - DENIES PAIN
PAIN_FUNCTIONAL_ASSESSMENT: NONE - DENIES PAIN

## 2023-08-29 NOTE — ED PROVIDER NOTES
sounds: Normal heart sounds. No murmur heard. No friction rub. Pulmonary:      Effort: Pulmonary effort is normal. No respiratory distress. Breath sounds: Normal breath sounds. No stridor. No wheezing. Abdominal:      General: Bowel sounds are normal. There is no distension. Palpations: Abdomen is soft. Tenderness: There is no abdominal tenderness. Musculoskeletal:         General: Normal range of motion. Cervical back: Normal range of motion and neck supple. Skin:     General: Skin is warm and dry. Capillary Refill: Capillary refill takes less than 2 seconds. Findings: No rash. Neurological:      Mental Status: She is alert and oriented to person, place, and time. Cranial Nerves: No cranial nerve deficit. Sensory: No sensory deficit. Coordination: Coordination normal.   Psychiatric:         Behavior: Behavior normal.         Thought Content: Thought content normal.         DIAGNOSTIC RESULTS     RADIOLOGY:   Non-plain film images such as CT, Ultrasound and MRI are read by the radiologist. Plain radiographic images are visualized and preliminarilyinterpreted by No att. providers found with the below findings:      Interpretation per the Radiologist below, if available at the time of this note:    US OB TRANSVAGINAL   Final Result   Intrauterine gestational sac corresponding to a 5-week-4-day gestation. No fetal pole at this time. Small subchronic hematoma. Mild free pelvic fluid.               ______________________________________    Electronically signed by: Alondra Baird M.D.    Date:     08/29/2023   Time:    14:01           LABS:  Labs Reviewed   CBC WITH AUTO DIFFERENTIAL - Abnormal; Notable for the following components:       Result Value    RBC 4.02 (*)     Hematocrit 36.7 (*)     MCHC 32.7 (*)     Neutrophils % 72.5 (*)     All other components within normal limits   COMPREHENSIVE METABOLIC PANEL W/ REFLEX TO MG FOR LOW K - Abnormal;

## 2023-08-29 NOTE — DISCHARGE INSTRUCTIONS
Please call Dr Kenney Rolling you are stable nothing concerning right now but the subchorionic hematoma needs to be followed and repeat US

## 2023-11-03 ENCOUNTER — HOSPITAL ENCOUNTER (EMERGENCY)
Age: 23
Discharge: HOME | End: 2023-11-03
Attending: EMERGENCY MEDICINE
Payer: MEDICAID

## 2023-11-03 ENCOUNTER — APPOINTMENT (OUTPATIENT)
Dept: ULTRASOUND IMAGING | Age: 23
End: 2023-11-03
Payer: MEDICAID

## 2023-11-03 VITALS
OXYGEN SATURATION: 98 % | TEMPERATURE: 98.7 F | SYSTOLIC BLOOD PRESSURE: 102 MMHG | WEIGHT: 120 LBS | RESPIRATION RATE: 16 BRPM | BODY MASS INDEX: 22.67 KG/M2 | HEART RATE: 78 BPM | DIASTOLIC BLOOD PRESSURE: 54 MMHG

## 2023-11-03 DIAGNOSIS — R10.9 ABDOMINAL PAIN DURING INTRAUTERINE PREGNANCY: ICD-10-CM

## 2023-11-03 DIAGNOSIS — O26.899 ABDOMINAL PAIN DURING INTRAUTERINE PREGNANCY: ICD-10-CM

## 2023-11-03 DIAGNOSIS — N30.00 ACUTE CYSTITIS WITHOUT HEMATURIA: Primary | ICD-10-CM

## 2023-11-03 LAB
ANION GAP SERPL CALCULATED.3IONS-SCNC: 9 MMOL/L (ref 7–19)
BACTERIA URNS QL MICRO: ABNORMAL /HPF
BASOPHILS # BLD: 0.1 K/UL (ref 0–0.2)
BASOPHILS NFR BLD: 0.6 % (ref 0–1)
BILIRUB UR QL STRIP: NEGATIVE
BUN SERPL-MCNC: 4 MG/DL (ref 6–20)
CALCIUM SERPL-MCNC: 8.9 MG/DL (ref 8.6–10)
CHLORIDE SERPL-SCNC: 102 MMOL/L (ref 98–111)
CLARITY UR: ABNORMAL
CO2 SERPL-SCNC: 24 MMOL/L (ref 22–29)
COLOR UR: YELLOW
CREAT SERPL-MCNC: 0.4 MG/DL (ref 0.5–0.9)
CRYSTALS URNS MICRO: ABNORMAL /HPF
EOSINOPHIL # BLD: 0.1 K/UL (ref 0–0.6)
EOSINOPHIL NFR BLD: 1.3 % (ref 0–5)
EPI CELLS #/AREA URNS AUTO: 8 /HPF (ref 0–5)
ERYTHROCYTE [DISTWIDTH] IN BLOOD BY AUTOMATED COUNT: 12.2 % (ref 11.5–14.5)
GLUCOSE SERPL-MCNC: 113 MG/DL (ref 74–109)
GLUCOSE UR STRIP.AUTO-MCNC: 250 MG/DL
GONADOTROPIN, CHORIONIC (HCG) QUANT: ABNORMAL MIU/ML (ref 0–5.3)
HCT VFR BLD AUTO: 36.9 % (ref 37–47)
HGB BLD-MCNC: 12.4 G/DL (ref 12–16)
HGB UR STRIP.AUTO-MCNC: ABNORMAL MG/L
HYALINE CASTS #/AREA URNS AUTO: 4 /HPF (ref 0–8)
IMM GRANULOCYTES # BLD: 0 K/UL
KETONES UR STRIP.AUTO-MCNC: NEGATIVE MG/DL
LEUKOCYTE ESTERASE UR QL STRIP.AUTO: NEGATIVE
LYMPHOCYTES # BLD: 2.2 K/UL (ref 1.1–4.5)
LYMPHOCYTES NFR BLD: 25.8 % (ref 20–40)
MCH RBC QN AUTO: 29.9 PG (ref 27–31)
MCHC RBC AUTO-ENTMCNC: 33.6 G/DL (ref 33–37)
MCV RBC AUTO: 88.9 FL (ref 81–99)
MONOCYTES # BLD: 0.6 K/UL (ref 0–0.9)
MONOCYTES NFR BLD: 7 % (ref 0–10)
NEUTROPHILS # BLD: 5.4 K/UL (ref 1.5–7.5)
NEUTS SEG NFR BLD: 64.9 % (ref 50–65)
NITRITE UR QL STRIP.AUTO: POSITIVE
PH UR STRIP.AUTO: 6.5 [PH] (ref 5–8)
PLATELET # BLD AUTO: 348 K/UL (ref 130–400)
PMV BLD AUTO: 9.6 FL (ref 9.4–12.3)
POTASSIUM SERPL-SCNC: 3.7 MMOL/L (ref 3.5–5)
PROT UR STRIP.AUTO-MCNC: NEGATIVE MG/DL
RBC # BLD AUTO: 4.15 M/UL (ref 4.2–5.4)
RBC #/AREA URNS AUTO: 7 /HPF (ref 0–4)
SODIUM SERPL-SCNC: 135 MMOL/L (ref 136–145)
SP GR UR STRIP.AUTO: 1.02 (ref 1–1.03)
UROBILINOGEN UR STRIP.AUTO-MCNC: 0.2 E.U./DL
WBC # BLD AUTO: 8.4 K/UL (ref 4.8–10.8)
WBC #/AREA URNS AUTO: 11 /HPF (ref 0–5)

## 2023-11-03 PROCEDURE — 85025 COMPLETE CBC W/AUTO DIFF WBC: CPT

## 2023-11-03 PROCEDURE — 84702 CHORIONIC GONADOTROPIN TEST: CPT

## 2023-11-03 PROCEDURE — 6370000000 HC RX 637 (ALT 250 FOR IP): Performed by: EMERGENCY MEDICINE

## 2023-11-03 PROCEDURE — 99284 EMERGENCY DEPT VISIT MOD MDM: CPT

## 2023-11-03 PROCEDURE — 36415 COLL VENOUS BLD VENIPUNCTURE: CPT

## 2023-11-03 PROCEDURE — 80048 BASIC METABOLIC PNL TOTAL CA: CPT

## 2023-11-03 PROCEDURE — 87186 SC STD MICRODIL/AGAR DIL: CPT

## 2023-11-03 PROCEDURE — 87086 URINE CULTURE/COLONY COUNT: CPT

## 2023-11-03 PROCEDURE — 81001 URINALYSIS AUTO W/SCOPE: CPT

## 2023-11-03 PROCEDURE — 76801 OB US < 14 WKS SINGLE FETUS: CPT

## 2023-11-03 RX ORDER — CEPHALEXIN 250 MG/1
500 CAPSULE ORAL ONCE
Status: COMPLETED | OUTPATIENT
Start: 2023-11-03 | End: 2023-11-03

## 2023-11-03 RX ORDER — CEPHALEXIN 500 MG/1
500 CAPSULE ORAL 2 TIMES DAILY
Qty: 14 CAPSULE | Refills: 0 | Status: SHIPPED | OUTPATIENT
Start: 2023-11-03 | End: 2023-11-10

## 2023-11-03 RX ADMIN — CEPHALEXIN 500 MG: 250 CAPSULE ORAL at 21:04

## 2023-11-03 ASSESSMENT — ENCOUNTER SYMPTOMS
VOMITING: 0
DIARRHEA: 0
COUGH: 1
ABDOMINAL PAIN: 1
SHORTNESS OF BREATH: 0

## 2023-11-03 ASSESSMENT — PAIN - FUNCTIONAL ASSESSMENT: PAIN_FUNCTIONAL_ASSESSMENT: NONE - DENIES PAIN

## 2023-11-03 NOTE — ED TRIAGE NOTES
Tristan Manriquez #[id number] used to triage pt. Explained process and probabilty of IV/urine collection. Pt denies questions at this time.

## 2023-11-04 NOTE — ED PROVIDER NOTES
[facility] EMERGENCY DEPT  EMERGENCY DEPARTMENT ENCOUNTER      Pt Name: Queen Veronica  MRN: [id number]  [facility] [place] 2000  Date of evaluation: 11/3/2023  Provider: Derrick Prasad DO    CHIEF COMPLAINT       Chief Complaint   Patient presents with    Abdominal Pain     4 months pregnant, pain onset yesterday morning         HISTORY OF PRESENT ILLNESS   (Location/Symptom, Timing/Onset, Context/Setting, Quality, Duration, Modifying Factors, Severity)  Note limiting factors. This is a 79-year-old female who is  presenting to the emergency department secondary to abdominal pain. The patient reports the symptoms started yesterday. She describes abdominal discomfort in the suprapubic area without complaints of vaginal bleeding or vaginal discharge. No complaints of fevers, chest pain or shortness of breath. The history is provided by the patient. The history is limited by a language barrier. A  was used. Nursing Notes were reviewed. REVIEW OF SYSTEMS    (2-9 systems for level 4, 10 or more for level 5)     Review of Systems   Constitutional:  Negative for fever. Respiratory:  Positive for cough. Negative for shortness of breath. Cardiovascular:  Negative for chest pain. Gastrointestinal:  Positive for abdominal pain. Negative for diarrhea and vomiting. Genitourinary:  Negative for vaginal bleeding and vaginal discharge. Except as noted above the remainder of the review of systems was reviewed and negative. PAST MEDICAL HISTORY   History reviewed. No pertinent past medical history.       SURGICAL HISTORY       Past Surgical History:   Procedure Laterality Date    APPENDECTOMY       SECTION      OVARY REMOVAL           CURRENT MEDICATIONS       Discharge Medication List as of 11/3/2023  9:10 PM          ALLERGIES     Nyquil severe cold-flu [phenyleph-doxylamine-dm-apap], Sudafed [pseudoephedrine], and Vicks vaporub [liniments]    FAMILY HISTORY     History

## 2023-11-04 NOTE — ED NOTES
Per pt and pt's visitor, pt was given discharge instructions verbally by Dr. Delroy Santiago with  and state they have no further questions at this time.       Altagracia Dodd  11/03/23 2123

## 2023-11-05 LAB
BACTERIA UR CULT: ABNORMAL
BACTERIA UR CULT: ABNORMAL
ORGANISM: ABNORMAL

## 2023-11-07 LAB
BACTERIA UR CULT: ABNORMAL
BACTERIA UR CULT: ABNORMAL
ORGANISM: ABNORMAL

## 2024-04-19 ENCOUNTER — HOSPITAL ENCOUNTER (OUTPATIENT)
Facility: HOSPITAL | Age: 24
Discharge: HOME | End: 2024-04-19
Attending: OBSTETRICS & GYNECOLOGY | Admitting: OBSTETRICS & GYNECOLOGY
Payer: MEDICAID

## 2024-04-19 VITALS
RESPIRATION RATE: 16 BRPM | SYSTOLIC BLOOD PRESSURE: 111 MMHG | DIASTOLIC BLOOD PRESSURE: 69 MMHG | TEMPERATURE: 97.9 F | HEART RATE: 92 BPM | OXYGEN SATURATION: 100 %

## 2024-04-19 PROBLEM — Z34.90 PREGNANCY: Status: ACTIVE | Noted: 2024-04-19

## 2024-04-19 PROBLEM — Z3A.39 39 WEEKS GESTATION OF PREGNANCY: Status: ACTIVE | Noted: 2024-04-19

## 2024-04-19 PROBLEM — O34.211 MATERNAL CARE DUE TO LOW TRANSVERSE UTERINE SCAR FROM PREVIOUS CESAREAN DELIVERY: Status: ACTIVE | Noted: 2024-04-19

## 2024-04-19 PROBLEM — O47.1 FALSE LABOR AFTER 37 WEEKS OF GESTATION WITHOUT DELIVERY: Status: ACTIVE | Noted: 2024-04-19

## 2024-04-19 LAB
ABO GROUP BLD: NORMAL
BASOPHILS # BLD AUTO: 0.03 10*3/MM3 (ref 0–0.2)
BASOPHILS NFR BLD AUTO: 0.3 % (ref 0–1.5)
BLD GP AB SCN SERPL QL: NEGATIVE
DEPRECATED RDW RBC AUTO: 46.1 FL (ref 37–54)
EOSINOPHIL # BLD AUTO: 0.13 10*3/MM3 (ref 0–0.4)
EOSINOPHIL NFR BLD AUTO: 1.5 % (ref 0.3–6.2)
ERYTHROCYTE [DISTWIDTH] IN BLOOD BY AUTOMATED COUNT: 15.3 % (ref 12.3–15.4)
HCT VFR BLD AUTO: 31.9 % (ref 34–46.6)
HGB BLD-MCNC: 10.1 G/DL (ref 12–15.9)
IMM GRANULOCYTES # BLD AUTO: 0.04 10*3/MM3 (ref 0–0.05)
IMM GRANULOCYTES NFR BLD AUTO: 0.5 % (ref 0–0.5)
LYMPHOCYTES # BLD AUTO: 1.98 10*3/MM3 (ref 0.7–3.1)
LYMPHOCYTES NFR BLD AUTO: 22.3 % (ref 19.6–45.3)
MCH RBC QN AUTO: 26.2 PG (ref 26.6–33)
MCHC RBC AUTO-ENTMCNC: 31.7 G/DL (ref 31.5–35.7)
MCV RBC AUTO: 82.9 FL (ref 79–97)
MONOCYTES # BLD AUTO: 0.73 10*3/MM3 (ref 0.1–0.9)
MONOCYTES NFR BLD AUTO: 8.2 % (ref 5–12)
NEUTROPHILS NFR BLD AUTO: 5.97 10*3/MM3 (ref 1.7–7)
NEUTROPHILS NFR BLD AUTO: 67.2 % (ref 42.7–76)
NRBC BLD AUTO-RTO: 0 /100 WBC (ref 0–0.2)
PLATELET # BLD AUTO: 352 10*3/MM3 (ref 140–450)
PMV BLD AUTO: 9.8 FL (ref 6–12)
RBC # BLD AUTO: 3.85 10*6/MM3 (ref 3.77–5.28)
RH BLD: POSITIVE
T PALLIDUM IGG SER QL: NORMAL
T&S EXPIRATION DATE: NORMAL
WBC NRBC COR # BLD AUTO: 8.88 10*3/MM3 (ref 3.4–10.8)

## 2024-04-19 PROCEDURE — G0463 HOSPITAL OUTPT CLINIC VISIT: HCPCS

## 2024-04-19 PROCEDURE — 86850 RBC ANTIBODY SCREEN: CPT | Performed by: OBSTETRICS & GYNECOLOGY

## 2024-04-19 PROCEDURE — 25810000003 LACTATED RINGERS PER 1000 ML: Performed by: OBSTETRICS & GYNECOLOGY

## 2024-04-19 PROCEDURE — 86900 BLOOD TYPING SEROLOGIC ABO: CPT | Performed by: OBSTETRICS & GYNECOLOGY

## 2024-04-19 PROCEDURE — 36415 COLL VENOUS BLD VENIPUNCTURE: CPT | Performed by: OBSTETRICS & GYNECOLOGY

## 2024-04-19 PROCEDURE — 86901 BLOOD TYPING SEROLOGIC RH(D): CPT | Performed by: OBSTETRICS & GYNECOLOGY

## 2024-04-19 PROCEDURE — 85025 COMPLETE CBC W/AUTO DIFF WBC: CPT | Performed by: OBSTETRICS & GYNECOLOGY

## 2024-04-19 PROCEDURE — 86780 TREPONEMA PALLIDUM: CPT | Performed by: OBSTETRICS & GYNECOLOGY

## 2024-04-19 PROCEDURE — G0378 HOSPITAL OBSERVATION PER HR: HCPCS

## 2024-04-19 RX ORDER — SODIUM CHLORIDE 0.9 % (FLUSH) 0.9 %
10 SYRINGE (ML) INJECTION EVERY 8 HOURS
Status: DISCONTINUED | OUTPATIENT
Start: 2024-04-19 | End: 2024-04-19 | Stop reason: HOSPADM

## 2024-04-19 RX ORDER — PRENATAL VIT/IRON FUM/FOLIC AC 27MG-0.8MG
1 TABLET ORAL DAILY
COMMUNITY

## 2024-04-19 RX ORDER — SODIUM CHLORIDE 0.9 % (FLUSH) 0.9 %
10 SYRINGE (ML) INJECTION AS NEEDED
Status: DISCONTINUED | OUTPATIENT
Start: 2024-04-19 | End: 2024-04-19 | Stop reason: HOSPADM

## 2024-04-19 RX ORDER — SODIUM CHLORIDE, SODIUM LACTATE, POTASSIUM CHLORIDE, CALCIUM CHLORIDE 600; 310; 30; 20 MG/100ML; MG/100ML; MG/100ML; MG/100ML
125 INJECTION, SOLUTION INTRAVENOUS CONTINUOUS
Status: DISCONTINUED | OUTPATIENT
Start: 2024-04-19 | End: 2024-04-19 | Stop reason: HOSPADM

## 2024-04-19 RX ADMIN — SODIUM CHLORIDE, POTASSIUM CHLORIDE, SODIUM LACTATE AND CALCIUM CHLORIDE 125 ML/HR: 600; 310; 30; 20 INJECTION, SOLUTION INTRAVENOUS at 01:40

## 2024-04-19 NOTE — H&P
[facility]  Obstetric History and Physical    Chief Complaint   Patient presents with    Abdominal Pain       Subjective               History provided through medical .    Patient is a 23 y.o. female  currently at 39w4d, who presents with contractions that began this evening.  She feels them more on her right side and radiates to her back.  After the initial hour of observation intensity had decreased some but were still present.  Denies vaginal bleeding, loss of fluid.  Good fetal movement.    No prenatal care records in EMR.  She thought she had received an ultrasound and care here but there is no record since phone messages last year.  She does not know the name of her doctor but believes she is scheduled for repeat section Monday morning.  After reviewing a image of her 3D ultrasound she receives care at a [facility].  RN called and spoke with Griselda ARMETNA&JULIA who also did not have any record of her scheduled for resection on Monday.    She has had 2 prior C-sections.    The following portions of the patients history were reviewed and updated as appropriate: current medications, allergies, past medical history, past surgical history, past family history, past social history, and problem list .       Prenatal Information:  Prenatal Results       Initial Prenatal Labs       Test Value Reference Range Date Time    Hemoglobin        Hematocrit        Platelets        Rubella IgG  1.16 index Immune >0.99 21 1030    Hepatitis B SAg  Negative  Negative 21 1030    Hepatitis C Ab        RPR  Non Reactive  Non Reactive 21 1030    T. Pallidum Ab         ABO  A   24 0135    Rh  Positive   24 0135    Antibody Screen        HIV  Non Reactive  Non Reactive 21 1030    Urine Culture  Final report   21 1030    Gonorrhea  Negative  Negative 21 1359    Chlamydia  Negative  Negative 21 1359    TSH        HgB A1c         Varicella IgG        HgB  Electrophoresis         Cystic fibrosis                   Fetal testing        Test Value Reference Range Date Time    NIPT        MSAFP        AFP-4                  2nd and 3rd Trimester       Test Value Reference Range Date Time    Hemoglobin (repeated)  10.1 g/dL 12.0 - 15.9 04/19/24 0135    Hematocrit (repeated)  31.9 % 34.0 - 46.6 04/19/24 0135    Platelets   352 10*3/mm3 140 - 450 04/19/24 0135    GCT        Antibody Screen (repeated)  Negative   04/19/24 0135    3rd TM syphilis scrn (repeated)  RPR         3rd TM syphilis scrn (repeated) FTA        GTT Fasting        GTT 1 Hr        GTT 2 Hr        GTT 3 Hr        Group B Strep                  Other testing        Test Value Reference Range Date Time    [place] IgG         CMV IgG                   Drug Screening       Test Value Reference Range Date Time    Amphetamine Screen        Barbiturate Screen        Benzodiazepine Screen        Methadone Screen        Phencyclidine Screen        Opiates Screen        THC Screen        Cocaine Screen        Propoxyphene Screen        Buprenorphine Screen        Methamphetamine Screen        Oxycodone Screen        Tricyclic Antidepressants Screen                  Legend    ^: Historical                          External Prenatal Results       Pregnancy Outside Results - Transcribed From Office Records - See Scanned Records For Details       Test Value Date Time    ABO  A  04/19/24 0135    Rh  Positive  04/19/24 0135    Antibody Screen  Negative  04/19/24 0135    Varicella IgG       Rubella  1.16 index 06/24/21 1030    Hgb  10.1 g/dL 04/19/24 0135    Hct  31.9 % 04/19/24 0135    Glucose Fasting GTT  85 mg/dL 09/16/21 0853    Glucose Tolerance Test 1 hour  134 mg/dL 09/16/21 0853    Glucose Tolerance Test 3 hour  116 mg/dL 09/16/21 0853    Gonorrhea (discrete)  Negative  09/27/21 1359    Chlamydia (discrete)  Negative  09/27/21 1359    RPR  Non Reactive  06/24/21 1030    VDRL       Syphilis Antibody       HBsAg   Negative  21 1030    Herpes Simplex Virus PCR       Herpes Simplex VIrus Culture       HIV  Non Reactive  21 1030    Hep C RNA Quant PCR       Hep C Antibody       AFP       Group B Strep       GBS Susceptibility to Clindamycin       GBS Susceptibility to Erythromycin       Fetal Fibronectin       Genetic Testing, Maternal Blood                 Drug Screening       Test Value Date Time    Urine Drug Screen       Amphetamine Screen       Barbiturate Screen       Benzodiazepine Screen       Methadone Screen       Phencyclidine Screen       Opiates Screen       THC Screen       Cocaine Screen       Propoxyphene Screen       Buprenorphine Screen       Methamphetamine Screen       Oxycodone Screen       Tricyclic Antidepressants Screen                 Legend    ^: Historical                             Past OB History:     OB History    Para Term  AB Living   4 2 2 0 1 2   SAB IAB Ectopic Molar Multiple Live Births   1 0 0 0 0 2      # Outcome Date GA Lbr Peng/2nd Weight Sex Type Anes PTL Lv   4 Current            3 Term 10/15/21 39w4d  3640 g (8 lb 0.4 oz) M CS-LTranv Spinal N JAEL      Birth Comments: HC: 34cm      Name: DOMINGO HODGSONSTACI      Apgar1: 8  Apgar5: 8   2 SAB 2021           1 Term 2018   3629 g (8 lb) M CS-Unspec Spinal  JAEL       Past Medical History: Past Medical History:   Diagnosis Date    Ovarian cyst       Past Surgical History Past Surgical History:   Procedure Laterality Date     SECTION           SECTION N/A 10/15/2021    Procedure:  SECTION REPEAT;  Surgeon: George Fried MD;  Location: [facility] LABOR DELIVERY;  Service: Obstetrics/Gynecology;  Laterality: N/A;    D & C WITH SUCTION N/A 3/23/2022    Procedure: DILATATION AND CURETTAGE WITH SUCTION;  Surgeon: George Fried MD;  Location: [facility] OR;  Service: Obstetrics/Gynecology;  Laterality: N/A;    OOPHORECTOMY      pt had one ovary removed at age 8 per her report for  ovarian cyst      Family History: History reviewed. No pertinent family history.   Social History:  reports that she has never smoked. She has never used smokeless tobacco.   reports no history of alcohol use.   reports no history of drug use.        Review of Systems      Objective     Vital Signs Range for the last 24 hours  Temperature: Temp:  [97.9 °F (36.6 °C)] 97.9 °F (36.6 °C)   Temp Source: Temp src: Oral   BP: BP: (111-133)/(69-87) 111/69   Pulse: Heart Rate:  [] 94   Respirations: Resp:  [18] 18   SPO2: SpO2:  [100 %] 100 %   O2 Amount (l/min):     O2 Devices     Weight:       Physical Examination: General appearance - alert, well appearing, and in no distress  Mental status - alert, oriented to person, place, and time    Cervix: Exam by:     Dilation: Cervical Dilation (cm): 1   Effacement: Cervical Effacement: 50%   Station:       Fetal Heart Rate Assessment   Method: Fetal HR Assessment Method: external   Beats/min: Fetal HR (beats/min): 130   Baseline: Fetal HR Baseline: normal range   Variability: Fetal HR Variability: moderate (amplitude range 6 to 25 bpm)   Accels: Fetal HR Accelerations: greater than/equal to 15 bpm, lasting at least 15 seconds   Decels: Fetal HR Decelerations: absent   Tracing Category:       Uterine Assessment   Method: Method: external tocotransducer, per patient report, palpation   Frequency (min): Contraction Frequency (Minutes): 3-4   Ctx Count in 10 min:     Duration:     Intensity: Contraction Intensity: mild by palpation   Intensity by IUPC:     Resting Tone: Uterine Resting Tone: soft by palpation   Resting Tone by IUPC:     Corrina Units:         Assessment & Plan       False labor after 37 weeks of gestation without delivery    Maternal care due to low transverse uterine scar from previous  delivery    39 weeks gestation of pregnancy        Assessment:  1.  -0-1-2 at 39.4 weeks presents with regular contractions.  No cervical change from /-3  after 2 hour prolonged labor check. No evidence of active labor  2. Cat I FHR    Plan:  1. Discharge home. Follow up with OB to confirm location of section.       Israel Flowers,   4/19/2024  03:00 CDT

## 2024-04-22 ENCOUNTER — HOSPITAL ENCOUNTER (INPATIENT)
Age: 24
LOS: 2 days | Discharge: HOME | End: 2024-04-24
Attending: OBSTETRICS & GYNECOLOGY | Admitting: OBSTETRICS & GYNECOLOGY
Payer: MEDICAID

## 2024-04-22 ENCOUNTER — ANESTHESIA EVENT (OUTPATIENT)
Dept: LABOR AND DELIVERY | Age: 24
End: 2024-04-22
Payer: MEDICAID

## 2024-04-22 ENCOUNTER — ANESTHESIA (OUTPATIENT)
Dept: LABOR AND DELIVERY | Age: 24
End: 2024-04-22
Payer: MEDICAID

## 2024-04-22 DIAGNOSIS — G89.18 POSTOPERATIVE PAIN: Primary | ICD-10-CM

## 2024-04-22 LAB
ABO/RH: NORMAL
AMPHET UR QL SCN: NEGATIVE
ANTIBODY SCREEN: NORMAL
BARBITURATES UR QL SCN: NEGATIVE
BENZODIAZ UR QL SCN: NEGATIVE
BUPRENORPHINE URINE: NEGATIVE
C TRACH DNA CVX QL NAA+PROBE: NOT DETECTED
CANNABINOIDS UR QL SCN: NEGATIVE
COCAINE UR QL SCN: NEGATIVE
DRUG SCREEN COMMENT UR-IMP: NORMAL
ERYTHROCYTE [DISTWIDTH] IN BLOOD BY AUTOMATED COUNT: 15 % (ref 11.5–14.5)
FENTANYL SCREEN, URINE: NEGATIVE
HCT VFR BLD AUTO: 31.4 % (ref 37–47)
HGB BLD-MCNC: 10 G/DL (ref 12–16)
MCH RBC QN AUTO: 26.5 PG (ref 27–31)
MCHC RBC AUTO-ENTMCNC: 31.8 G/DL (ref 33–37)
MCV RBC AUTO: 83.3 FL (ref 81–99)
METHADONE UR QL SCN: NEGATIVE
METHAMPHETAMINE, URINE: NEGATIVE
N GONORRHOEA DNA CERV MUCUS QL NAA+PROBE: NOT DETECTED
OPIATES UR QL SCN: NEGATIVE
OXYCODONE UR QL SCN: NEGATIVE
PCP UR QL SCN: NEGATIVE
PLATELET # BLD AUTO: 358 K/UL (ref 130–400)
PMV BLD AUTO: 9.6 FL (ref 9.4–12.3)
RBC # BLD AUTO: 3.77 M/UL (ref 4.2–5.4)
RPR SER QL: NORMAL
T VAGINALIS DNA GENITAL QL NAA+PROBE: NOT DETECTED
TRICYCLIC, URINE: NEGATIVE
WBC # BLD AUTO: 7 K/UL (ref 4.8–10.8)

## 2024-04-22 PROCEDURE — 87591 N.GONORRHOEAE DNA AMP PROB: CPT

## 2024-04-22 PROCEDURE — 80307 DRUG TEST PRSMV CHEM ANLYZR: CPT

## 2024-04-22 PROCEDURE — 86850 RBC ANTIBODY SCREEN: CPT

## 2024-04-22 PROCEDURE — G0480 DRUG TEST DEF 1-7 CLASSES: HCPCS

## 2024-04-22 PROCEDURE — 36415 COLL VENOUS BLD VENIPUNCTURE: CPT

## 2024-04-22 PROCEDURE — 2500000003 HC RX 250 WO HCPCS: Performed by: NURSE ANESTHETIST, CERTIFIED REGISTERED

## 2024-04-22 PROCEDURE — 6370000000 HC RX 637 (ALT 250 FOR IP): Performed by: NURSE ANESTHETIST, CERTIFIED REGISTERED

## 2024-04-22 PROCEDURE — 86592 SYPHILIS TEST NON-TREP QUAL: CPT

## 2024-04-22 PROCEDURE — 2580000003 HC RX 258: Performed by: OBSTETRICS & GYNECOLOGY

## 2024-04-22 PROCEDURE — C1765 ADHESION BARRIER: HCPCS | Performed by: OBSTETRICS & GYNECOLOGY

## 2024-04-22 PROCEDURE — 7100000001 HC PACU RECOVERY - ADDTL 15 MIN: Performed by: OBSTETRICS & GYNECOLOGY

## 2024-04-22 PROCEDURE — 3700000001 HC ADD 15 MINUTES (ANESTHESIA): Performed by: OBSTETRICS & GYNECOLOGY

## 2024-04-22 PROCEDURE — 2709999900 HC NON-CHARGEABLE SUPPLY: Performed by: OBSTETRICS & GYNECOLOGY

## 2024-04-22 PROCEDURE — 87491 CHLMYD TRACH DNA AMP PROBE: CPT

## 2024-04-22 PROCEDURE — 87661 TRICHOMONAS VAGINALIS AMPLIF: CPT

## 2024-04-22 PROCEDURE — 86901 BLOOD TYPING SEROLOGIC RH(D): CPT

## 2024-04-22 PROCEDURE — 6360000002 HC RX W HCPCS: Performed by: NURSE ANESTHETIST, CERTIFIED REGISTERED

## 2024-04-22 PROCEDURE — 6370000000 HC RX 637 (ALT 250 FOR IP): Performed by: OBSTETRICS & GYNECOLOGY

## 2024-04-22 PROCEDURE — 86900 BLOOD TYPING SEROLOGIC ABO: CPT

## 2024-04-22 PROCEDURE — 85027 COMPLETE CBC AUTOMATED: CPT

## 2024-04-22 PROCEDURE — 3609079900 HC CESAREAN SECTION: Performed by: OBSTETRICS & GYNECOLOGY

## 2024-04-22 PROCEDURE — 3700000000 HC ANESTHESIA ATTENDED CARE: Performed by: OBSTETRICS & GYNECOLOGY

## 2024-04-22 PROCEDURE — 6360000002 HC RX W HCPCS: Performed by: OBSTETRICS & GYNECOLOGY

## 2024-04-22 PROCEDURE — 1220000000 HC SEMI PRIVATE OB R&B

## 2024-04-22 PROCEDURE — 7100000000 HC PACU RECOVERY - FIRST 15 MIN: Performed by: OBSTETRICS & GYNECOLOGY

## 2024-04-22 RX ORDER — ACETAMINOPHEN 500 MG
1000 TABLET ORAL EVERY 8 HOURS PRN
Status: DISCONTINUED | OUTPATIENT
Start: 2024-04-22 | End: 2024-04-22

## 2024-04-22 RX ORDER — KETOROLAC TROMETHAMINE 30 MG/ML
30 INJECTION, SOLUTION INTRAMUSCULAR; INTRAVENOUS EVERY 6 HOURS
Status: COMPLETED | OUTPATIENT
Start: 2024-04-22 | End: 2024-04-23

## 2024-04-22 RX ORDER — SODIUM CHLORIDE 0.9 % (FLUSH) 0.9 %
5-40 SYRINGE (ML) INJECTION EVERY 12 HOURS SCHEDULED
Status: DISCONTINUED | OUTPATIENT
Start: 2024-04-22 | End: 2024-04-22

## 2024-04-22 RX ORDER — MISOPROSTOL 200 UG/1
400 TABLET ORAL PRN
Status: DISCONTINUED | OUTPATIENT
Start: 2024-04-22 | End: 2024-04-24 | Stop reason: HOSPADM

## 2024-04-22 RX ORDER — MISOPROSTOL 200 UG/1
800 TABLET ORAL PRN
Status: DISCONTINUED | OUTPATIENT
Start: 2024-04-22 | End: 2024-04-24 | Stop reason: HOSPADM

## 2024-04-22 RX ORDER — ONDANSETRON 2 MG/ML
INJECTION INTRAMUSCULAR; INTRAVENOUS PRN
Status: DISCONTINUED | OUTPATIENT
Start: 2024-04-22 | End: 2024-04-22 | Stop reason: SDUPTHER

## 2024-04-22 RX ORDER — ACETAMINOPHEN 500 MG
1000 TABLET ORAL EVERY 8 HOURS
Status: DISCONTINUED | OUTPATIENT
Start: 2024-04-23 | End: 2024-04-24 | Stop reason: HOSPADM

## 2024-04-22 RX ORDER — KETOROLAC TROMETHAMINE 30 MG/ML
30 INJECTION, SOLUTION INTRAMUSCULAR; INTRAVENOUS EVERY 6 HOURS
Status: DISCONTINUED | OUTPATIENT
Start: 2024-04-22 | End: 2024-04-22

## 2024-04-22 RX ORDER — CARBOPROST TROMETHAMINE 250 UG/ML
250 INJECTION, SOLUTION INTRAMUSCULAR PRN
Status: DISCONTINUED | OUTPATIENT
Start: 2024-04-22 | End: 2024-04-24 | Stop reason: HOSPADM

## 2024-04-22 RX ORDER — IBUPROFEN 400 MG/1
800 TABLET ORAL EVERY 8 HOURS
Status: DISCONTINUED | OUTPATIENT
Start: 2024-04-23 | End: 2024-04-24 | Stop reason: HOSPADM

## 2024-04-22 RX ORDER — BUPIVACAINE HYDROCHLORIDE 5 MG/ML
30 INJECTION, SOLUTION EPIDURAL; INTRACAUDAL ONCE
Status: DISCONTINUED | OUTPATIENT
Start: 2024-04-22 | End: 2024-04-24 | Stop reason: HOSPADM

## 2024-04-22 RX ORDER — SODIUM CHLORIDE 0.9 % (FLUSH) 0.9 %
5-40 SYRINGE (ML) INJECTION PRN
Status: DISCONTINUED | OUTPATIENT
Start: 2024-04-22 | End: 2024-04-24 | Stop reason: HOSPADM

## 2024-04-22 RX ORDER — KETAMINE HCL IN NACL, ISO-OSM 100MG/10ML
SYRINGE (ML) INJECTION PRN
Status: DISCONTINUED | OUTPATIENT
Start: 2024-04-22 | End: 2024-04-22 | Stop reason: SDUPTHER

## 2024-04-22 RX ORDER — CEFAZOLIN SODIUM 1 G/3ML
INJECTION, POWDER, FOR SOLUTION INTRAMUSCULAR; INTRAVENOUS PRN
Status: DISCONTINUED | OUTPATIENT
Start: 2024-04-22 | End: 2024-04-22 | Stop reason: SDUPTHER

## 2024-04-22 RX ORDER — IBUPROFEN 400 MG/1
800 TABLET ORAL EVERY 8 HOURS
Status: DISCONTINUED | OUTPATIENT
Start: 2024-04-23 | End: 2024-04-22

## 2024-04-22 RX ORDER — ONDANSETRON 2 MG/ML
4 INJECTION INTRAMUSCULAR; INTRAVENOUS ONCE
Status: DISCONTINUED | OUTPATIENT
Start: 2024-04-22 | End: 2024-04-24 | Stop reason: HOSPADM

## 2024-04-22 RX ORDER — ONDANSETRON 4 MG/1
4 TABLET, ORALLY DISINTEGRATING ORAL EVERY 8 HOURS PRN
Status: DISCONTINUED | OUTPATIENT
Start: 2024-04-22 | End: 2024-04-24 | Stop reason: HOSPADM

## 2024-04-22 RX ORDER — ONDANSETRON 2 MG/ML
4 INJECTION INTRAMUSCULAR; INTRAVENOUS EVERY 6 HOURS PRN
Status: DISCONTINUED | OUTPATIENT
Start: 2024-04-22 | End: 2024-04-24 | Stop reason: HOSPADM

## 2024-04-22 RX ORDER — DEXMEDETOMIDINE HYDROCHLORIDE 100 UG/ML
INJECTION, SOLUTION INTRAVENOUS PRN
Status: DISCONTINUED | OUTPATIENT
Start: 2024-04-22 | End: 2024-04-22 | Stop reason: SDUPTHER

## 2024-04-22 RX ORDER — SODIUM CHLORIDE 9 MG/ML
INJECTION, SOLUTION INTRAVENOUS PRN
Status: DISCONTINUED | OUTPATIENT
Start: 2024-04-22 | End: 2024-04-24 | Stop reason: HOSPADM

## 2024-04-22 RX ORDER — DEXAMETHASONE SODIUM PHOSPHATE 10 MG/ML
INJECTION, SOLUTION INTRAMUSCULAR; INTRAVENOUS PRN
Status: DISCONTINUED | OUTPATIENT
Start: 2024-04-22 | End: 2024-04-22 | Stop reason: SDUPTHER

## 2024-04-22 RX ORDER — SODIUM CHLORIDE 0.9 % (FLUSH) 0.9 %
5-40 SYRINGE (ML) INJECTION PRN
Status: DISCONTINUED | OUTPATIENT
Start: 2024-04-22 | End: 2024-04-22

## 2024-04-22 RX ORDER — BUPIVACAINE HYDROCHLORIDE 7.5 MG/ML
INJECTION, SOLUTION INTRASPINAL
Status: COMPLETED | OUTPATIENT
Start: 2024-04-22 | End: 2024-04-22

## 2024-04-22 RX ORDER — NALOXONE HYDROCHLORIDE 0.4 MG/ML
INJECTION, SOLUTION INTRAMUSCULAR; INTRAVENOUS; SUBCUTANEOUS PRN
Status: DISCONTINUED | OUTPATIENT
Start: 2024-04-22 | End: 2024-04-22 | Stop reason: HOSPADM

## 2024-04-22 RX ORDER — KETOROLAC TROMETHAMINE 30 MG/ML
INJECTION, SOLUTION INTRAMUSCULAR; INTRAVENOUS PRN
Status: DISCONTINUED | OUTPATIENT
Start: 2024-04-22 | End: 2024-04-22 | Stop reason: SDUPTHER

## 2024-04-22 RX ORDER — METHYLERGONOVINE MALEATE 0.2 MG/ML
200 INJECTION INTRAVENOUS PRN
Status: DISCONTINUED | OUTPATIENT
Start: 2024-04-22 | End: 2024-04-24 | Stop reason: HOSPADM

## 2024-04-22 RX ORDER — ONDANSETRON 2 MG/ML
4 INJECTION INTRAMUSCULAR; INTRAVENOUS EVERY 6 HOURS PRN
Status: DISCONTINUED | OUTPATIENT
Start: 2024-04-22 | End: 2024-04-22

## 2024-04-22 RX ORDER — CITRIC ACID/SODIUM CITRATE 334-500MG
30 SOLUTION, ORAL ORAL ONCE
Status: DISCONTINUED | OUTPATIENT
Start: 2024-04-22 | End: 2024-04-24 | Stop reason: HOSPADM

## 2024-04-22 RX ORDER — SODIUM CHLORIDE 0.9 % (FLUSH) 0.9 %
10 SYRINGE (ML) INJECTION PRN
Status: DISCONTINUED | OUTPATIENT
Start: 2024-04-22 | End: 2024-04-22

## 2024-04-22 RX ORDER — POLYETHYLENE GLYCOL 3350 17 G/17G
17 POWDER, FOR SOLUTION ORAL DAILY
Status: DISCONTINUED | OUTPATIENT
Start: 2024-04-22 | End: 2024-04-24 | Stop reason: HOSPADM

## 2024-04-22 RX ORDER — SODIUM CHLORIDE 9 MG/ML
INJECTION, SOLUTION INTRAVENOUS PRN
Status: DISCONTINUED | OUTPATIENT
Start: 2024-04-22 | End: 2024-04-22

## 2024-04-22 RX ORDER — SODIUM CHLORIDE 0.9 % (FLUSH) 0.9 %
5-40 SYRINGE (ML) INJECTION EVERY 12 HOURS SCHEDULED
Status: DISCONTINUED | OUTPATIENT
Start: 2024-04-22 | End: 2024-04-24 | Stop reason: HOSPADM

## 2024-04-22 RX ORDER — OXYCODONE HYDROCHLORIDE 5 MG/1
5 TABLET ORAL EVERY 4 HOURS PRN
Status: DISCONTINUED | OUTPATIENT
Start: 2024-04-22 | End: 2024-04-24 | Stop reason: HOSPADM

## 2024-04-22 RX ORDER — SODIUM CHLORIDE, SODIUM LACTATE, POTASSIUM CHLORIDE, CALCIUM CHLORIDE 600; 310; 30; 20 MG/100ML; MG/100ML; MG/100ML; MG/100ML
INJECTION, SOLUTION INTRAVENOUS CONTINUOUS
Status: DISCONTINUED | OUTPATIENT
Start: 2024-04-22 | End: 2024-04-24 | Stop reason: HOSPADM

## 2024-04-22 RX ORDER — OXYCODONE HYDROCHLORIDE 10 MG/1
10 TABLET ORAL EVERY 4 HOURS PRN
Status: DISCONTINUED | OUTPATIENT
Start: 2024-04-22 | End: 2024-04-24 | Stop reason: HOSPADM

## 2024-04-22 RX ORDER — SODIUM CHLORIDE, SODIUM LACTATE, POTASSIUM CHLORIDE, AND CALCIUM CHLORIDE .6; .31; .03; .02 G/100ML; G/100ML; G/100ML; G/100ML
1000 INJECTION, SOLUTION INTRAVENOUS ONCE
Status: DISCONTINUED | OUTPATIENT
Start: 2024-04-22 | End: 2024-04-24 | Stop reason: HOSPADM

## 2024-04-22 RX ADMIN — ACETAMINOPHEN 1000 MG: 500 TABLET ORAL at 17:24

## 2024-04-22 RX ADMIN — DEXAMETHASONE SODIUM PHOSPHATE 5 MG: 10 INJECTION, SOLUTION INTRAMUSCULAR; INTRAVENOUS at 08:06

## 2024-04-22 RX ADMIN — PHENYLEPHRINE HYDROCHLORIDE 100 MCG: 10 INJECTION INTRAVENOUS at 08:12

## 2024-04-22 RX ADMIN — DEXMEDETOMIDINE HYDROCHLORIDE 20 MCG: 100 INJECTION, SOLUTION INTRAVENOUS at 08:48

## 2024-04-22 RX ADMIN — Medication 1000 ML/HR: at 08:28

## 2024-04-22 RX ADMIN — KETOROLAC TROMETHAMINE 30 MG: 30 INJECTION, SOLUTION INTRAMUSCULAR at 08:56

## 2024-04-22 RX ADMIN — SODIUM CHLORIDE, SODIUM LACTATE, POTASSIUM CHLORIDE, AND CALCIUM CHLORIDE: 600; 310; 30; 20 INJECTION, SOLUTION INTRAVENOUS at 07:47

## 2024-04-22 RX ADMIN — SODIUM CHLORIDE, PRESERVATIVE FREE 10 ML: 5 INJECTION INTRAVENOUS at 20:55

## 2024-04-22 RX ADMIN — Medication 10 MG: at 08:43

## 2024-04-22 RX ADMIN — KETOROLAC TROMETHAMINE 30 MG: 30 INJECTION INTRAMUSCULAR; INTRAVENOUS at 20:56

## 2024-04-22 RX ADMIN — PHENYLEPHRINE HYDROCHLORIDE 150 MCG: 10 INJECTION INTRAVENOUS at 08:47

## 2024-04-22 RX ADMIN — ONDANSETRON 8 MG: 2 INJECTION INTRAMUSCULAR; INTRAVENOUS at 07:55

## 2024-04-22 RX ADMIN — DEXMEDETOMIDINE HYDROCHLORIDE 5 MCG: 100 INJECTION, SOLUTION, CONCENTRATE INTRAVENOUS at 08:02

## 2024-04-22 RX ADMIN — Medication 87.3 MILLI-UNITS/MIN: at 10:11

## 2024-04-22 RX ADMIN — OXYCODONE HYDROCHLORIDE 10 MG: 10 TABLET ORAL at 11:54

## 2024-04-22 RX ADMIN — BUPIVACAINE HYDROCHLORIDE IN DEXTROSE 10.5 MG: 7.5 INJECTION, SOLUTION SUBARACHNOID at 08:02

## 2024-04-22 RX ADMIN — CEFAZOLIN 2 G: 1 INJECTION, POWDER, FOR SOLUTION INTRAMUSCULAR; INTRAVENOUS at 08:07

## 2024-04-22 RX ADMIN — KETOROLAC TROMETHAMINE 30 MG: 30 INJECTION INTRAMUSCULAR; INTRAVENOUS at 14:32

## 2024-04-22 RX ADMIN — SODIUM CHLORIDE, SODIUM LACTATE, POTASSIUM CHLORIDE, AND CALCIUM CHLORIDE: 600; 310; 30; 20 INJECTION, SOLUTION INTRAVENOUS at 08:28

## 2024-04-22 RX ADMIN — Medication 10 MG: at 08:48

## 2024-04-22 RX ADMIN — PHENYLEPHRINE HYDROCHLORIDE 100 MCG: 10 INJECTION INTRAVENOUS at 08:44

## 2024-04-22 RX ADMIN — OXYCODONE HYDROCHLORIDE 10 MG: 10 TABLET ORAL at 17:24

## 2024-04-22 RX ADMIN — POLYETHYLENE GLYCOL 3350 17 G: 17 POWDER, FOR SOLUTION ORAL at 16:00

## 2024-04-22 SDOH — ECONOMIC STABILITY: FOOD INSECURITY: WITHIN THE PAST 12 MONTHS, YOU WORRIED THAT YOUR FOOD WOULD RUN OUT BEFORE YOU GOT MONEY TO BUY MORE.: NEVER TRUE

## 2024-04-22 SDOH — ECONOMIC STABILITY: INCOME INSECURITY: IN THE PAST 12 MONTHS, HAS THE ELECTRIC, GAS, OIL, OR WATER COMPANY THREATENED TO SHUT OFF SERVICE IN YOUR HOME?: YES

## 2024-04-22 SDOH — ECONOMIC STABILITY: INCOME INSECURITY: HOW HARD IS IT FOR YOU TO PAY FOR THE VERY BASICS LIKE FOOD, HOUSING, MEDICAL CARE, AND HEATING?: VERY HARD

## 2024-04-22 ASSESSMENT — PATIENT HEALTH QUESTIONNAIRE - PHQ9
SUM OF ALL RESPONSES TO PHQ9 QUESTIONS 1 & 2: 2
SUM OF ALL RESPONSES TO PHQ QUESTIONS 1-9: 2
1. LITTLE INTEREST OR PLEASURE IN DOING THINGS: SEVERAL DAYS
SUM OF ALL RESPONSES TO PHQ QUESTIONS 1-9: 2
2. FEELING DOWN, DEPRESSED OR HOPELESS: SEVERAL DAYS

## 2024-04-22 ASSESSMENT — PAIN DESCRIPTION - LOCATION
LOCATION: INCISION
LOCATION: INCISION
LOCATION: ABDOMEN
LOCATION: INCISION;ABDOMEN

## 2024-04-22 ASSESSMENT — PAIN SCALES - GENERAL
PAINLEVEL_OUTOF10: 9
PAINLEVEL_OUTOF10: 8
PAINLEVEL_OUTOF10: 8

## 2024-04-22 ASSESSMENT — PAIN DESCRIPTION - DESCRIPTORS: DESCRIPTORS: BURNING

## 2024-04-22 NOTE — PLAN OF CARE
Problem: Skin/Tissue Integrity - Adult  Goal: Incisions, wounds, or drain sites healing without S/S of infection  Outcome: Progressing     Problem: Postpartum  Goal: Incisions, wounds, or drain sites healing without S/S of infection  Outcome: Progressing  Goal: Experiences normal postpartum course  Description:  Postpartum OB-Pregnancy care plan goal which identifies if the mother is experiencing a normal postpartum course  Outcome: Progressing  Goal: Appropriate maternal -  bonding  Description:  Postpartum OB-Pregnancy care plan goal which identifies if the mother and  are bonding appropriately  Outcome: Progressing  Goal: Establishment of infant feeding pattern  Description:  Postpartum OB-Pregnancy care plan goal which identifies if the mother is establishing a feeding pattern with their   Outcome: Progressing     Problem: Pain  Goal: Verbalizes/displays adequate comfort level or baseline comfort level  Outcome: Progressing     Problem: Infection - Adult  Goal: Absence of infection at discharge  Outcome: Progressing     Problem: Safety - Adult  Goal: Free from fall injury  Outcome: Progressing     Problem: Discharge Planning  Goal: Discharge to home or other facility with appropriate resources  Outcome: Progressing     Problem: Chronic Conditions and Co-morbidities  Goal: Patient's chronic conditions and co-morbidity symptoms are monitored and maintained or improved  Outcome: Progressing     Problem: Vaginal Birth or  Section  Goal: Fetal and maternal status remain reassuring during the birth process  Description:  Birth OB-Pregnancy care plan goal which identifies if the fetal and maternal status remain reassuring during the birth process  Outcome: Completed

## 2024-04-22 NOTE — FLOWSHEET NOTE
ANNAMARIE Garcia CRNA in the department at this time. This RN spoke to ANNAMARIE Garcia CRNA regarding pt low blood pressure. No new orders given at this time.

## 2024-04-22 NOTE — FLOWSHEET NOTE
This RN and ISSAC Monreal at the bedside at this time. Ivonne #[id number] interpreting at this time. This RN received hand off report and discussed the plan of care for the day with the pt. Pt verbalized understanding. All questions answered.

## 2024-04-22 NOTE — FLOWSHEET NOTE
This RN at the bedside at this time. Anatoliy #[id number] interpreting at this time. This RN explained to the pt about Pitocin and the need for it after delivery. Pt verbalized understanding. This RN asked the pt if she is in pain. Pt denies pain at this time. Pt denies any needs at this time.

## 2024-04-22 NOTE — FLOWSHEET NOTE
Nursery folder reviewed. Infant safety measures explained. Instructed parents that infant is to be with someone that has a matching ID band, or infant is to be in nursery. Pl[employer] tag system reviewed. Informed parent that maternal child is the only floor with yellow name badges and infant is only to leave room with someone from OB floor. Explained that infant is to be in crib in the hallway, not held in arms. Safe sleep discussed. 24 hour screenings discussed and brochures given. Verbalized understanding.     Included in folder:  A new beginning book; personal guide to postpartum and  care  Hepatitis B information brochure  Recommended immunization schedule  Feeding chart  Birth certificate worksheet  Special dinner menu  Sources for community help; health department list  Falls and safety contract  Safe sleep flyer  Hearing screen consent     RN used translators Jeannine # [id number] and Nicole #[id number]

## 2024-04-22 NOTE — CARE COORDINATION
Marion provided Pt with fiancial resources in [place], physical activity resources in [place], post partum depression resources in [place], food resources in [place], transportation resources in [place], mental health resources in [place], and housing resources in [place] (written documentation was provided to the Pt at D/C. Resources and additional resources provided to the Pt based on SDOH scores and additional need. Electronically signed by Marion Desai on 4/22/2024 at 7:57 AM

## 2024-04-22 NOTE — FLOWSHEET NOTE
This RN at the bedside at this time. Tk # [id number] interpreting at this time.ANNAMARIE Garcia CRNA discussed anesthesia for  with the pt.  Pt verbalized understanding. All questions answered.

## 2024-04-22 NOTE — ANESTHESIA PRE PROCEDURE
Department of Anesthesiology  Preprocedure Note       Name:  Stella Plata   Age:  23 y.o.  :  2000                                          MRN:  [id number]         Date:  2024      Surgeon: Surgeon(s):  Klarissa Bethea MD    Procedure: Procedure(s):   SECTION    Medications prior to admission:   Prior to Admission medications    Not on File       Current medications:    Current Facility-Administered Medications   Medication Dose Route Frequency Provider Last Rate Last Admin    lactated ringers IV soln infusion   IntraVENous Continuous Klarissa Bethea MD   New Bag at 24 0828    lactated ringers bolus 1,000 mL  1,000 mL IntraVENous Once Klarissa Bethea MD        sodium chloride flush 0.9 % injection 5-40 mL  5-40 mL IntraVENous 2 times per day Klarissa Bethea MD        sodium chloride flush 0.9 % injection 10 mL  10 mL IntraVENous PRN Klarissa Bethea MD        0.9 % sodium chloride infusion   IntraVENous PRN Klarissa Bethea MD        famotidine (PEPCID) 20 mg in sodium chloride (PF) 0.9 % 10 mL injection  20 mg IntraVENous Once Klarissa Bethea MD        oxytocin (PITOCIN) 30 units in 500 mL infusion  87.3 alyse-units/min IntraVENous Continuous PRN Klarissa Bethea MD        And    oxytocin (PITOCIN) 10 unit bolus from the bag  10 Units IntraVENous PRN Klarissa Bethea MD        ondansetron (ZOFRAN) injection 4 mg  4 mg IntraVENous Q6H PRN Klarissa Bethea MD        topical skin adhesive stick   Topical Once Klarissa Bethea MD        BUPivacaine (PF) (MARCAINE) 0.5 % injection 150 mg  30 mL IntraDERmal Once Klarissa Bethea MD        citric acid-sodium citrate (BICITRA) solution 30 mL  30 mL Oral Once Niko Garcia APRN - CRNA        famotidine (PEPCID) 20 mg in sodium chloride (PF) 0.9 % 10 mL injection  20 mg IntraVENous Once Niko Garcia APRN - CRNA        ondansetron (ZOFRAN)

## 2024-04-22 NOTE — FLOWSHEET NOTE
Niko #[id number] interpreting at this time. This RN at the bedside at this time and discussed with the patient about heading back for the . Pt ambulated to the OR with a strong steady gait. Niko #[id number] translated during the  and translated while pt was transferred to recovery.

## 2024-04-22 NOTE — FLOWSHEET NOTE
Urszula #[id number] available for translation at this time. RN at bedside assisting pt to her feet for the first time. Catheter removed without complications. Blue tip intact.Pt on feet without c/o lightheadedness or dizziness. Slow and steady ambulation to bathroom. Unable to void at this time. Pt assisted back to feet and ambulated to chair. Abdominal binder reapplied. No complaints at this time.

## 2024-04-22 NOTE — LACTATION NOTE
Ireri #[id number] available for translation  Mother is aware of the benefits of breastfeeding and chooses to formula feed at this time. Suppression information given. Mother knows when to call MD if needed. Formula feeding booklet given written in Samoan.

## 2024-04-22 NOTE — FLOWSHEET NOTE
RN transferred pt to postpartum room 213 on stretcher. No complications. Pt requesting pain medication at this time. Katiana #[id number] available on  for education regarding cleaning pt up and bringing pain medication.

## 2024-04-22 NOTE — FLOWSHEET NOTE
Roseanne #[id number] available to translate at this time. Education provided to mother that Dr. May ordered Miralax and approved Tylenol. RN asked pt if there is anything she needs at this time and assessed pain level. Pt denied pain and refused needing anything.

## 2024-04-22 NOTE — DISCHARGE INSTRUCTIONS
transporte  [address], [address], [place] [address]  [email or web address]  Solicite asistencia para el transporte terrestre con tarjetas de gasolina, autobús o tren, o transporte aéreo con vuelos de pilotos voluntarios o las aerolíneas comerciales.    [facility]  Si usted está establecido con un proveedor de atención primaria de [facility], [facility] proporcionará transporte a cualquier radha, incluidas las citas de seguimiento a especialistas y otros referidos de atención primaria, según sea necesario.  [email or web address]  [phone]     [employer] To [employer]: condado de Cindy  [email or web address]  [phone]  Correo electrónico: [email or web address]  Dirección postal:  [employer], [address] [place] [address]    Servicio basado en miembros que puede ayudar con el transporte, entre otros servicios de apoyo.  Simplemente imprima la solicitud desde stone sitio web y envíela por correo electrónico o correo tradicional. Puede solicitar ser membresía si es un: [facility] y tiene 55 años o más, o es un adulto que vive en el condado de Cindy con ben discapacidad (incluso temporal). La tarifa de la membresía es de $360 por año natural para ben persona o de $600 para un hogar. Made to Stay cuenta con membresías limitadas que ayudan a las personas con necesidades a corto plazo, lo que ayuda a cubrir las necesidades de transporte de aquellos que no pueden conducir debido a la cirugía y/o al uso de medicamentos. Póngase en contacto con ellos para obtener más información.     Road to Recovery by American Cancer Society   https://www.cancer.org/support-programs-and-services/road-to-recovery.html  1-657.807.3463    El programa Road To Recovery de la American Cancer Society proporciona transporte terrestre gratuito de janette y vuelta a citas médicas relacionadas con el cáncer para personas con cáncer que no tienen quien las lleve o no pueden conducir.  [id number]  [phone]    Despensa de alimentos  Distribución de alimentos. En la mayoría de los casos, la persona debe traer la ID/el documento de identidad. Comuníquese para obtener información.  [facility]  [address] [address]  [phone]  [employer] of [address] [address]  [phone]  [facility]   [address] [place] [address]  [phone]  [address] [place] [address]  [phone]  [facility]  [address] [place] [address]  [phone]  [facility]   [phone]  [facility]   [phone]    Condado de [facility] para adultos mayores  Normalmente sirve ben comida diaria y sirve armand punto de contacto para el programa Meals on Wheels.  [facility]  [address] [place] [address]  [phone]    Despensa de alimentos  Distribución de alimentos. En la mayoría de los casos, la persona debe traer la ID/el documento de identidad. Comuníquese para obtener información.  [facility]  [address] [place] [address]  [phone]    [address] [place] [address]  [phone]     Condado de Cristofer  Comedores comunitarios   Normalmente sirve un almuerzo diario viridiana la semana; comuníquese para conocer para los horarios de comida.  [employer]  [address], [place] [address]  [phone]  Sirve un almuerzo gratuito de lunes a viernes de 11:00 a. m. a 1:00 p. m. Llame para jose si la entrega de comidas es ben opción.    Despensa de alimentos  Distribución de alimentos. En la mayoría de los casos, la persona debe traer la ID/el documento de identidad. Comuníquese para obtener información.  [address] [address]  [phone]  [address]

## 2024-04-22 NOTE — FLOWSHEET NOTE
Aria #[id number] & Anne #[id number]     services used for admission, assessment and consents signed. Patient arrived ambulatory to labor and delivery for a scheduled . Patient denies LOF, ctxs, and vaginal bleeding. Patient states +FM. EFM and toco applied to soft, nontender abdomen.

## 2024-04-22 NOTE — FLOWSHEET NOTE
RN questioned pt about allergy list and what her allergic reaction was to these using  Jeannine #[id number] and Nicole #[id number]. Pt responded that she broke out in a full body rash and itchiness. She states she does take Tylenol at home. RN notified Dr. May of allergies and received orders to go ahead and order Tylenol scheduled per protocol and Miralax daily. Orders are in.

## 2024-04-22 NOTE — ANESTHESIA PROCEDURE NOTES
Spinal Block    Patient location during procedure: OB  Reason for block: primary anesthetic  Staffing  Performed: resident/CRNA   Resident/CRNA: Niko Garcia APRN - CRNA  Performed by: Niko Garcia APRN - CRNA  Authorized by: Niko Garcia APRN - CRNA    Spinal Block  Patient position: sitting  Prep: Betadine  Patient monitoring: continuous pulse ox, cardiac monitor and frequent blood pressure checks  Approach: midline  Location: L4/L5  Provider prep: sterile gloves and mask  Needle  Needle type: Pencan   Needle gauge: 25 G  Needle length: 3.5 in  Assessment  Sensory level: T4  Events: cerebrospinal fluid  Swirl obtained: Yes  CSF: clear  Attempts: 1  Hemodynamics: stable  Preanesthetic Checklist  Completed: patient identified, IV checked, site marked, risks and benefits discussed, surgical/procedural consents, equipment checked, pre-op evaluation, timeout performed, anesthesia consent given, oxygen available, monitors applied/VS acknowledged, fire risk safety assessment completed and verbalized and blood product R/B/A discussed and consented

## 2024-04-23 LAB
ERYTHROCYTE [DISTWIDTH] IN BLOOD BY AUTOMATED COUNT: 14.8 % (ref 11.5–14.5)
HCT VFR BLD AUTO: 26.8 % (ref 37–47)
HGB BLD-MCNC: 9 G/DL (ref 12–16)
MCH RBC QN AUTO: 27.2 PG (ref 27–31)
MCHC RBC AUTO-ENTMCNC: 33.6 G/DL (ref 33–37)
MCV RBC AUTO: 81 FL (ref 81–99)
PLATELET # BLD AUTO: 354 K/UL (ref 130–400)
PMV BLD AUTO: 9.8 FL (ref 9.4–12.3)
RBC # BLD AUTO: 3.31 M/UL (ref 4.2–5.4)
WBC # BLD AUTO: 9 K/UL (ref 4.8–10.8)

## 2024-04-23 PROCEDURE — 36415 COLL VENOUS BLD VENIPUNCTURE: CPT

## 2024-04-23 PROCEDURE — 6370000000 HC RX 637 (ALT 250 FOR IP): Performed by: OBSTETRICS & GYNECOLOGY

## 2024-04-23 PROCEDURE — 99231 SBSQ HOSP IP/OBS SF/LOW 25: CPT | Performed by: NURSE PRACTITIONER

## 2024-04-23 PROCEDURE — 6360000002 HC RX W HCPCS: Performed by: OBSTETRICS & GYNECOLOGY

## 2024-04-23 PROCEDURE — 1220000000 HC SEMI PRIVATE OB R&B

## 2024-04-23 PROCEDURE — 85027 COMPLETE CBC AUTOMATED: CPT

## 2024-04-23 RX ADMIN — ACETAMINOPHEN 1000 MG: 500 TABLET ORAL at 01:01

## 2024-04-23 RX ADMIN — POLYETHYLENE GLYCOL 3350 17 G: 17 POWDER, FOR SOLUTION ORAL at 08:38

## 2024-04-23 RX ADMIN — IBUPROFEN 800 MG: 400 TABLET, FILM COATED ORAL at 14:51

## 2024-04-23 RX ADMIN — ACETAMINOPHEN 1000 MG: 500 TABLET ORAL at 18:49

## 2024-04-23 RX ADMIN — KETOROLAC TROMETHAMINE 30 MG: 30 INJECTION INTRAMUSCULAR; INTRAVENOUS at 08:38

## 2024-04-23 RX ADMIN — ACETAMINOPHEN 1000 MG: 500 TABLET ORAL at 08:38

## 2024-04-23 RX ADMIN — KETOROLAC TROMETHAMINE 30 MG: 30 INJECTION INTRAMUSCULAR; INTRAVENOUS at 04:15

## 2024-04-23 ASSESSMENT — PAIN SCALES - GENERAL
PAINLEVEL_OUTOF10: 2
PAINLEVEL_OUTOF10: 4
PAINLEVEL_OUTOF10: 2

## 2024-04-23 ASSESSMENT — PAIN DESCRIPTION - LOCATION
LOCATION: ABDOMEN
LOCATION: ABDOMEN
LOCATION: INCISION
LOCATION: INCISION
LOCATION: ABDOMEN

## 2024-04-23 ASSESSMENT — PAIN DESCRIPTION - DESCRIPTORS
DESCRIPTORS: CRAMPING;SORE
DESCRIPTORS: BURNING
DESCRIPTORS: BURNING

## 2024-04-23 ASSESSMENT — PAIN DESCRIPTION - ORIENTATION
ORIENTATION: LOWER
ORIENTATION: LOWER

## 2024-04-23 NOTE — PLAN OF CARE
Problem: ABCDS Injury Assessment  Goal: Absence of physical injury  Outcome: Progressing     Problem: Neurosensory - Adult  Goal: Achieves stable or improved neurological status  Outcome: Progressing  Goal: Absence of seizures  Outcome: Progressing  Goal: Remains free of injury related to seizures activity  Outcome: Progressing  Goal: Achieves maximal functionality and self care  Outcome: Progressing     Problem: Respiratory - Adult  Goal: Achieves optimal ventilation and oxygenation  Outcome: Progressing     Problem: Cardiovascular - Adult  Goal: Maintains optimal cardiac output and hemodynamic stability  Outcome: Progressing  Goal: Absence of cardiac dysrhythmias or at baseline  Outcome: Progressing     Problem: Skin/Tissue Integrity - Adult  Goal: Skin integrity remains intact  Outcome: Progressing  Goal: Incisions, wounds, or drain sites healing without S/S of infection  Outcome: Progressing  Goal: Oral mucous membranes remain intact  Outcome: Progressing     Problem: Musculoskeletal - Adult  Goal: Return mobility to safest level of function  Outcome: Progressing  Goal: Maintain proper alignment of affected body part  Outcome: Progressing  Goal: Return ADL status to a safe level of function  Outcome: Progressing     Problem: Postpartum  Goal: Incisions, wounds, or drain sites healing without S/S of infection  Outcome: Progressing  Goal: Experiences normal postpartum course  Description:  Postpartum OB-Pregnancy care plan goal which identifies if the mother is experiencing a normal postpartum course  Outcome: Progressing  Goal: Appropriate maternal -  bonding  Description:  Postpartum OB-Pregnancy care plan goal which identifies if the mother and  are bonding appropriately  Outcome: Progressing  Goal: Establishment of infant feeding pattern  Description:  Postpartum OB-Pregnancy care plan goal which identifies if the mother is establishing a feeding pattern with their   Outcome:

## 2024-04-23 NOTE — CARE COORDINATION
Consult: ALEX Cronin met with Pt at bedside and used the interrupter machine to discuss Pt needs/concerns and to discuss this writer provided multiple resources for the Pt to help with needs and the written documentation will be provided to her at D/C documentation. Electronically signed by Marion Desai on 4/23/2024 at 4:15 PM

## 2024-04-23 NOTE — PROGRESS NOTES
Postpartum Day 1:  Delivery  Micronesian interpretation services used    Patient is a  that delivered on The patient feels well. The patient denies emotional concerns. Pain is well controlled with current medications. The baby iswell. Baby is feeding via bottle - Similac with iron. Urinary output is adequate. The patient is ambulating well. The patient is tolerating a normal diet. Flatus has been passed.    Objective:      Vitals:    24 0831   BP: 107/84   Pulse: 84   Resp:    Temp: 97.7 °F (36.5 °C)   SpO2: 100%         General:    alert, appears stated age, and cooperative   Bowel Sounds:  active   Lochia:  appropriate   Uterine Fundus:   firm   Incision:  healing well, no significant drainage, no dehiscence, no significant erythema   DVT Evaluation:  No evidence of DVT seen on physical exam.     Lab Results   Component Value Date    WBC 9.0 2024    HGB 9.0 (L) 2024    HCT 26.8 (L) 2024    MCV 81.0 2024     2024     Assessment:     Status post  section. Doing well postoperatively.     Plan:     Continue current care. Start po iron bid.  Over 50% of the total visit time of 30 minutes was spent on counseling and/or coordination of care. All questions were answered and the patient voiced understanding.

## 2024-04-23 NOTE — FLOWSHEET NOTE
Kira #[id number] and Valarie #[id number] (after Kira signed off) translating at this time for morning assessment and medications. No complaints from mother at this time.

## 2024-04-23 NOTE — PLAN OF CARE
Problem: ABCDS Injury Assessment  Goal: Absence of physical injury  Outcome: Progressing     Problem: Neurosensory - Adult  Goal: Achieves stable or improved neurological status  Outcome: Progressing  Goal: Absence of seizures  Outcome: Progressing  Goal: Remains free of injury related to seizures activity  Outcome: Progressing  Goal: Achieves maximal functionality and self care  Outcome: Progressing     Problem: Respiratory - Adult  Goal: Achieves optimal ventilation and oxygenation  Outcome: Progressing     Problem: Cardiovascular - Adult  Goal: Maintains optimal cardiac output and hemodynamic stability  Outcome: Progressing  Goal: Absence of cardiac dysrhythmias or at baseline  Outcome: Progressing     Problem: Skin/Tissue Integrity - Adult  Goal: Skin integrity remains intact  Outcome: Progressing  Goal: Incisions, wounds, or drain sites healing without S/S of infection  Outcome: Progressing  Goal: Oral mucous membranes remain intact  Outcome: Progressing     Problem: Musculoskeletal - Adult  Goal: Return mobility to safest level of function  Outcome: Progressing  Goal: Maintain proper alignment of affected body part  Outcome: Progressing  Goal: Return ADL status to a safe level of function  Outcome: Progressing     Problem: Postpartum  Goal: Incisions, wounds, or drain sites healing without S/S of infection  Outcome: Progressing  Goal: Experiences normal postpartum course  Description:  Postpartum OB-Pregnancy care plan goal which identifies if the mother is experiencing a normal postpartum course  Outcome: Progressing  Goal: Appropriate maternal -  bonding  Description:  Postpartum OB-Pregnancy care plan goal which identifies if the mother and  are bonding appropriately  Outcome: Progressing  Goal: Establishment of infant feeding pattern  Description:  Postpartum OB-Pregnancy care plan goal which identifies if the mother is establishing a feeding pattern with their   Outcome:  Progressing     Problem: Pain  Goal: Verbalizes/displays adequate comfort level or baseline comfort level  Outcome: Progressing     Problem: Infection - Adult  Goal: Absence of infection at discharge  Outcome: Progressing  Goal: Absence of infection during hospitalization  Outcome: Progressing  Goal: Absence of fever/infection during anticipated neutropenic period  Outcome: Progressing     Problem: Safety - Adult  Goal: Free from fall injury  Outcome: Progressing     Problem: Discharge Planning  Goal: Discharge to home or other facility with appropriate resources  Outcome: Progressing     Problem: Chronic Conditions and Co-morbidities  Goal: Patient's chronic conditions and co-morbidity symptoms are monitored and maintained or improved  Outcome: Progressing

## 2024-04-23 NOTE — FLOWSHEET NOTE
RN at bedside for reassessment and infant vitals. Pt in restroom when RN entered pt room. Pt refused assistance in the bathroom. Rishabh #[id number] translating at this time. Pt came out of bathroom and stated she had a lot of blood in the toilet and felt like she had some in her underwear. RN asked pt to lie on the bed so that RN could observe. Upon observation, large clot noted in pt's underwear. RN felt a firm fundus and no more clots were expelled. RN called out for second RN to observe. RNs told pt we will keep an eye on her bleeding and to call out if she notices anymore large clots. Pt denies pain or discomfort at this time.

## 2024-04-23 NOTE — FLOWSHEET NOTE
RN at bedside to update feedings on infant. Tommy #[id number] translating at this time. RN obtained updated feeds from mother. Mother told RN she never received lunch todat, but did get breakfast. RN apologized and brought mother her motrin and a sandwich. RN also told charge nurse who notified dietary. No complaints from mother at this time.

## 2024-04-23 NOTE — OP NOTE
Operative Note      Patient: Stella Plata  YOB: 2000  MRN: [id number]    Date of Procedure: 2024    * No Diagnosis Codes entered *    Post-Op Diagnosis: Same       Procedure(s):   SECTION    Surgeon(s):  Klarissa Bethea MD    Assistant:   First Assistant: Page Becerril    Anesthesia: Spinal    Estimated Blood Loss (mL): {NUMBERS; EBL:67265}    Complications: {Symptoms; Intra-op complications:63707}    Specimens:   * No specimens in log *    Implants:  * No implants in log *      Drains:   [REMOVED] Urinary Catheter 24 Manzanares (Removed)   Output (mL) 900 mL 24 1736       Findings:  Infection Present At Time Of Surgery (PATOS) (choose all levels that have infection present):  {PATOS LEVELS:950471149}  Other Findings: ***    Detailed Description of Procedure:     The patient was seen in the Holding Room. The risks, benefits, complications, treatment options, and expected outcomes were discussed with the patient.  The patient concurred with the proposed plan, giving informed consent.  The site of surgery properly noted/marked. The patient was taken to the Operating Room  identified as Stella Plata and the procedure verified as  Delivery. A Time Out was held and the above information confirmed.    After induction of anesthesia, the patient was draped and prepped in the usual sterile manner. A Pfannenstiel incision was made and carried down through the subcutaneous tissue to the fascia. Fascial incision was made and extended transversely. The fascia was  from the underlying rectus tissue superiorly and inferiorly. The peritoneum was identified and entered. Peritoneal incision was extended longitudinally. A low transverse uterine incision was made.  Delivered from *** presentation was a *** gram *** with Apgar scores of {Numbers; 0-10:17218} at one minute and {Numbers; 0-10:18975} at five minutes. After the umbilical cord was clamped and cut cord blood

## 2024-04-24 ENCOUNTER — TELEPHONE (OUTPATIENT)
Dept: OBGYN CLINIC | Age: 24
End: 2024-04-24

## 2024-04-24 VITALS
HEART RATE: 80 BPM | BODY MASS INDEX: 24.56 KG/M2 | WEIGHT: 130 LBS | SYSTOLIC BLOOD PRESSURE: 102 MMHG | OXYGEN SATURATION: 100 % | RESPIRATION RATE: 18 BRPM | DIASTOLIC BLOOD PRESSURE: 71 MMHG | TEMPERATURE: 97.2 F

## 2024-04-24 PROCEDURE — 99239 HOSP IP/OBS DSCHRG MGMT >30: CPT | Performed by: NURSE PRACTITIONER

## 2024-04-24 PROCEDURE — 6370000000 HC RX 637 (ALT 250 FOR IP): Performed by: OBSTETRICS & GYNECOLOGY

## 2024-04-24 RX ORDER — IBUPROFEN 800 MG/1
800 TABLET ORAL EVERY 8 HOURS PRN
Qty: 90 TABLET | Refills: 0 | Status: SHIPPED | OUTPATIENT
Start: 2024-04-24

## 2024-04-24 RX ORDER — OXYCODONE HYDROCHLORIDE 5 MG/1
5 TABLET ORAL EVERY 6 HOURS PRN
Qty: 12 TABLET | Refills: 0 | Status: SHIPPED | OUTPATIENT
Start: 2024-04-24 | End: 2024-04-27

## 2024-04-24 RX ADMIN — ACETAMINOPHEN 1000 MG: 500 TABLET ORAL at 04:56

## 2024-04-24 RX ADMIN — POLYETHYLENE GLYCOL 3350 17 G: 17 POWDER, FOR SOLUTION ORAL at 09:23

## 2024-04-24 RX ADMIN — IBUPROFEN 800 MG: 400 TABLET, FILM COATED ORAL at 00:56

## 2024-04-24 ASSESSMENT — PAIN DESCRIPTION - LOCATION: LOCATION: INCISION

## 2024-04-24 ASSESSMENT — PAIN SCALES - GENERAL
PAINLEVEL_OUTOF10: 0
PAINLEVEL_OUTOF10: 3

## 2024-04-24 NOTE — DISCHARGE SUMMARY
Postpartum Day 2:  Delivery  Interpretation services for Albanian utilized at bedside    Patient is a  that delivered on The patient feels well. The patient denies emotional concerns. Pain is well controlled with current medications. The baby iswell. Baby is feeding via bottle - Similac with iron. Urinary output is adequate. The patient is ambulating well. The patient is tolerating a normal diet. Flatus has been passed.    Objective:      Vitals:    24 0803   BP: 102/71   Pulse: 80   Resp: 18   Temp: 97.2 °F (36.2 °C)   SpO2: 100%         General:    alert, appears stated age, and cooperative   Bowel Sounds:  active   Lochia:  appropriate   Uterine Fundus:   firm   Incision:  healing well, no significant drainage, no dehiscence, no significant erythema   DVT Evaluation:  No evidence of DVT seen on physical exam.     Lab Results   Component Value Date    WBC 9.0 2024    HGB 9.0 (L) 2024    HCT 26.8 (L) 2024    MCV 81.0 2024     2024     Assessment:     Status post  section. Doing well postoperatively.     Plan:     Discharge home with standard precautions and return to [facility] at 2024 at 4pm.   Over 50% of the total visit time of 35 minutes was spent on counseling and/or coordination of care. All questions were answered and the patient voiced understanding.

## 2024-04-24 NOTE — FLOWSHEET NOTE
Discharge teaching completed. All questions answered. Follow up appointments reviewed using  Dalia: #[id number]. Discharge videos are in english so nurse used  to verbally go over discharge teaching and postpartum depression screening. Mother verbalized understanding and denied any questions.

## 2024-04-24 NOTE — PLAN OF CARE
Problem: ABCDS Injury Assessment  Goal: Absence of physical injury  4/24/2024 0113 by Isabell Garcia RN  Outcome: Progressing  4/23/2024 1819 by Imani Lugo RN  Outcome: Progressing     Problem: Neurosensory - Adult  Goal: Achieves stable or improved neurological status  4/24/2024 0113 by Isabell Garcia RN  Outcome: Progressing  4/23/2024 1819 by Imani Lugo RN  Outcome: Progressing  Goal: Absence of seizures  4/24/2024 0113 by Isabell Garcia RN  Outcome: Progressing  4/23/2024 1819 by Imani Lugo RN  Outcome: Progressing  Goal: Remains free of injury related to seizures activity  4/24/2024 0113 by Isabell Garcia RN  Outcome: Progressing  4/23/2024 1819 by Imani Lugo RN  Outcome: Progressing  Goal: Achieves maximal functionality and self care  4/24/2024 0113 by Isabell Garcia RN  Outcome: Progressing  4/23/2024 1819 by Imani Lugo RN  Outcome: Progressing     Problem: Respiratory - Adult  Goal: Achieves optimal ventilation and oxygenation  4/24/2024 0113 by Isabell Garcia RN  Outcome: Progressing  4/23/2024 1819 by Imani Lugo RN  Outcome: Progressing     Problem: Cardiovascular - Adult  Goal: Maintains optimal cardiac output and hemodynamic stability  4/24/2024 0113 by Isabell Garcia RN  Outcome: Progressing  4/23/2024 1819 by Imani Lugo RN  Outcome: Progressing  Goal: Absence of cardiac dysrhythmias or at baseline  4/24/2024 0113 by Isabell Garcia RN  Outcome: Progressing  4/23/2024 1819 by Imani Lugo RN  Outcome: Progressing     Problem: Skin/Tissue Integrity - Adult  Goal: Skin integrity remains intact  4/24/2024 0113 by Isabell Garcia RN  Outcome: Progressing  4/23/2024 1819 by Imani Lugo RN  Outcome: Progressing  Goal: Incisions, wounds, or drain sites healing without S/S of infection  4/24/2024 0113 by Isabell Garcia RN  Outcome: Progressing  4/23/2024 1819 by Imani Lugo RN  Outcome: Progressing  Goal: Oral mucous membranes remain

## 2024-04-24 NOTE — ANESTHESIA POSTPROCEDURE EVALUATION
Department of Anesthesiology  Postprocedure Note    Patient: Stella Plata  MRN: [id number]  YOB: 2000  Date of evaluation: 2024    Procedure Summary       Date: 24 Room / Location: [facility]    Anesthesia Start: 0752 Anesthesia Stop: 915    Procedure:  SECTION (Abdomen) Diagnosis: (Prior Uterine Surgery)    Surgeons: Klarissa Bethea MD Responsible Provider: Niko Garcia APRN - CRNA    Anesthesia Type: spinal, general ASA Status: 2            Anesthesia Type: No value filed.    Marina Phase I: Marina Score: 9    Marina Phase II:      Anesthesia Post Evaluation    Patient location during evaluation: PACU  Patient participation: complete - patient participated  Level of consciousness: awake and alert  Airway patency: patent  Nausea & Vomiting: no nausea and no vomiting  Respiratory status: acceptable  Hydration status: stable  Comments: Blood pressure 106/73, pulse 79, temperature 97.6 °F (36.4 °C), temperature source Temporal, resp. rate 16, weight 59 kg (130 lb), SpO2 100 %, unknown if currently breastfeeding.      Pain management: adequate    No notable events documented.

## 2024-04-24 NOTE — TELEPHONE ENCOUNTER
----- Message from LILIANA Maharaj sent at 4/24/2024  9:14 AM CDT -----  Regarding: israel carmona

## 2024-04-26 ENCOUNTER — LACTATION ENCOUNTER (OUTPATIENT)
Dept: LABOR AND DELIVERY | Age: 24
End: 2024-04-26

## 2024-04-26 NOTE — LACTATION NOTE
This note was copied from a baby's chart.  This is to inform you that baby has been seen since discharge    Day of Life: 4    : 24 @ 0826    Mom's blood type: A+    Baby's blood type:    Birth weight:  8-6.8 lb (3820g)    Discharge weight: 8-0.8 lb (3650g)    Today's weight: 8-5.0 lb (3765g)    Weight loss: -1.44%    Bilizap: (draw serum if within 3 mg/dl of phototherapy on graph): 5.1    Infant feeding (type and how often in the last 24 hours): formula feeding baby 1 oz every 2-2.5 hours    Stools (in the last 24 hours): 6+    Voids (in the last 24 hours): 6+    Color: wnl  Gums: moist  Skin: warm/dry  Cord: dry  Circumcision: n/a  Fontanels: soft/flat  Activity: alert/active    Education to mother: feed baby 2-3 oz every 2-3 hours, call and schedule 2 wk follow up with Dr. Machado. Office number given.

## 2024-05-16 NOTE — H&P
Stella Plata is a 23 y.o. female patient who presents at 39 wks for RLTCS.    Past Medical History:   Diagnosis Date    Chlamydia 2023     OB History          4    Para   3    Term   2            AB   1    Living   3         SAB   1    IAB        Ectopic        Molar        Multiple   0    Live Births   3              39w2d  Estimated Date of Delivery: 24  Allergies   Allergen Reactions    Nyquil Severe Cold-Flu [Phenyleph-Doxylamine-Dm-Apap]     Sudafed [Pseudoephedrine]     Vicks Vaporub [Liniments]      Principal Problem:    Postpartum care following  delivery  Resolved Problems:    * No resolved hospital problems. *    Blood pressure 102/71, pulse 80, temperature 97.2 °F (36.2 °C), temperature source Temporal, resp. rate 18, weight 59 kg (130 lb), SpO2 100 %, unknown if currently breastfeeding.    Maternal Medical History:   Fetal activity: Perceived fetal activity is normal.    Prenatal complications: no prenatal complications  Prenatal Complications - Diabetes: none.      Maternal Exam:   Abdomen: Patient reports no abdominal tenderness. Surgical scars: low transverse.    Fetal presentation: vertex  Introitus: Normal vulva. Normal vagina.      Fetal Exam  Fetal Monitor Review: Mode: ultrasound.    Variability: moderate (6-25 bpm).    Pattern: accelerations present and no decelerations.    Fetal State Assessment: Category I - tracings are normal.      Assessment:  1.  IUP at 39 wks  2.  Prior CS x 2     Plan:  1.  Admit to L&D  2.  Routine admission labs and intrapartum care  3.  Prep for surgery  4.  Anesthesia consult      Klarissa Morris MD

## 2024-07-20 ENCOUNTER — APPOINTMENT (OUTPATIENT)
Dept: CT IMAGING | Age: 24
End: 2024-07-20

## 2024-07-20 ENCOUNTER — HOSPITAL ENCOUNTER (EMERGENCY)
Age: 24
Discharge: HOME | End: 2024-07-20
Attending: EMERGENCY MEDICINE

## 2024-07-20 VITALS
SYSTOLIC BLOOD PRESSURE: 96 MMHG | BODY MASS INDEX: 23.04 KG/M2 | HEART RATE: 70 BPM | RESPIRATION RATE: 18 BRPM | OXYGEN SATURATION: 100 % | WEIGHT: 130 LBS | HEIGHT: 63 IN | TEMPERATURE: 98.9 F | DIASTOLIC BLOOD PRESSURE: 59 MMHG

## 2024-07-20 DIAGNOSIS — N20.0 KIDNEY STONE: Primary | ICD-10-CM

## 2024-07-20 LAB
ALBUMIN SERPL-MCNC: 4.2 G/DL (ref 3.5–5.2)
ALP SERPL-CCNC: 112 U/L (ref 35–104)
ALT SERPL-CCNC: 25 U/L (ref 5–33)
ANION GAP SERPL CALCULATED.3IONS-SCNC: 11 MMOL/L (ref 7–19)
AST SERPL-CCNC: 18 U/L (ref 5–32)
BACTERIA URNS QL MICRO: NEGATIVE /HPF
BASOPHILS # BLD: 0 K/UL (ref 0–0.2)
BASOPHILS NFR BLD: 0.4 % (ref 0–1)
BILIRUB SERPL-MCNC: 0.2 MG/DL (ref 0.2–1.2)
BILIRUB UR QL STRIP: NEGATIVE
BUN SERPL-MCNC: 12 MG/DL (ref 6–20)
CALCIUM SERPL-MCNC: 9.1 MG/DL (ref 8.6–10)
CHLORIDE SERPL-SCNC: 105 MMOL/L (ref 98–111)
CLARITY UR: ABNORMAL
CO2 SERPL-SCNC: 23 MMOL/L (ref 22–29)
COLOR UR: YELLOW
CREAT SERPL-MCNC: 0.6 MG/DL (ref 0.5–0.9)
CRYSTALS URNS MICRO: ABNORMAL /HPF
EOSINOPHIL # BLD: 0 K/UL (ref 0–0.6)
EOSINOPHIL NFR BLD: 0.3 % (ref 0–5)
EPI CELLS #/AREA URNS AUTO: 5 /HPF (ref 0–5)
ERYTHROCYTE [DISTWIDTH] IN BLOOD BY AUTOMATED COUNT: 14.9 % (ref 11.5–14.5)
GLUCOSE SERPL-MCNC: 131 MG/DL (ref 74–109)
GLUCOSE UR STRIP.AUTO-MCNC: NEGATIVE MG/DL
HCG UR QL: NEGATIVE
HCT VFR BLD AUTO: 39.1 % (ref 37–47)
HGB BLD-MCNC: 12.4 G/DL (ref 12–16)
HGB UR STRIP.AUTO-MCNC: ABNORMAL MG/L
HYALINE CASTS #/AREA URNS AUTO: 4 /HPF (ref 0–8)
IMM GRANULOCYTES # BLD: 0 K/UL
KETONES UR STRIP.AUTO-MCNC: NEGATIVE MG/DL
LEUKOCYTE ESTERASE UR QL STRIP.AUTO: NEGATIVE
LIPASE SERPL-CCNC: 11 U/L (ref 13–60)
LYMPHOCYTES # BLD: 1.3 K/UL (ref 1.1–4.5)
LYMPHOCYTES NFR BLD: 12.1 % (ref 20–40)
MCH RBC QN AUTO: 26.8 PG (ref 27–31)
MCHC RBC AUTO-ENTMCNC: 31.7 G/DL (ref 33–37)
MCV RBC AUTO: 84.4 FL (ref 81–99)
MONOCYTES # BLD: 0.3 K/UL (ref 0–0.9)
MONOCYTES NFR BLD: 2.9 % (ref 0–10)
NEUTROPHILS # BLD: 9.1 K/UL (ref 1.5–7.5)
NEUTS SEG NFR BLD: 84.1 % (ref 50–65)
NITRITE UR QL STRIP.AUTO: NEGATIVE
PH UR STRIP.AUTO: 5.5 [PH] (ref 5–8)
PLATELET # BLD AUTO: 380 K/UL (ref 130–400)
PMV BLD AUTO: 9 FL (ref 9.4–12.3)
POTASSIUM SERPL-SCNC: 4 MMOL/L (ref 3.5–5)
PROT SERPL-MCNC: 8.1 G/DL (ref 6.6–8.7)
PROT UR STRIP.AUTO-MCNC: 30 MG/DL
RBC # BLD AUTO: 4.63 M/UL (ref 4.2–5.4)
RBC #/AREA URNS HPF: ABNORMAL /HPF (ref 0–2)
SODIUM SERPL-SCNC: 139 MMOL/L (ref 136–145)
SP GR UR STRIP.AUTO: 1.02 (ref 1–1.03)
UROBILINOGEN UR STRIP.AUTO-MCNC: 0.2 E.U./DL
WBC # BLD AUTO: 10.8 K/UL (ref 4.8–10.8)
WBC #/AREA URNS AUTO: 3 /HPF (ref 0–5)

## 2024-07-20 PROCEDURE — 96374 THER/PROPH/DIAG INJ IV PUSH: CPT

## 2024-07-20 PROCEDURE — 74150 CT ABDOMEN W/O CONTRAST: CPT

## 2024-07-20 PROCEDURE — 85025 COMPLETE CBC W/AUTO DIFF WBC: CPT

## 2024-07-20 PROCEDURE — 81001 URINALYSIS AUTO W/SCOPE: CPT

## 2024-07-20 PROCEDURE — 6360000002 HC RX W HCPCS: Performed by: PHYSICIAN ASSISTANT

## 2024-07-20 PROCEDURE — 2580000003 HC RX 258: Performed by: PHYSICIAN ASSISTANT

## 2024-07-20 PROCEDURE — 96361 HYDRATE IV INFUSION ADD-ON: CPT

## 2024-07-20 PROCEDURE — 96375 TX/PRO/DX INJ NEW DRUG ADDON: CPT

## 2024-07-20 PROCEDURE — 80053 COMPREHEN METABOLIC PANEL: CPT

## 2024-07-20 PROCEDURE — 84703 CHORIONIC GONADOTROPIN ASSAY: CPT

## 2024-07-20 PROCEDURE — 99284 EMERGENCY DEPT VISIT MOD MDM: CPT

## 2024-07-20 PROCEDURE — 36415 COLL VENOUS BLD VENIPUNCTURE: CPT

## 2024-07-20 PROCEDURE — 83690 ASSAY OF LIPASE: CPT

## 2024-07-20 RX ORDER — MORPHINE SULFATE 4 MG/ML
4 INJECTION, SOLUTION INTRAMUSCULAR; INTRAVENOUS ONCE
Status: COMPLETED | OUTPATIENT
Start: 2024-07-20 | End: 2024-07-20

## 2024-07-20 RX ORDER — 0.9 % SODIUM CHLORIDE 0.9 %
1000 INTRAVENOUS SOLUTION INTRAVENOUS ONCE
Status: COMPLETED | OUTPATIENT
Start: 2024-07-20 | End: 2024-07-20

## 2024-07-20 RX ORDER — ONDANSETRON 2 MG/ML
4 INJECTION INTRAMUSCULAR; INTRAVENOUS ONCE
Status: COMPLETED | OUTPATIENT
Start: 2024-07-20 | End: 2024-07-20

## 2024-07-20 RX ORDER — KETOROLAC TROMETHAMINE 10 MG/1
10 TABLET, FILM COATED ORAL EVERY 6 HOURS PRN
Qty: 15 TABLET | Refills: 0 | Status: SHIPPED | OUTPATIENT
Start: 2024-07-20

## 2024-07-20 RX ORDER — ONDANSETRON 4 MG/1
4 TABLET, ORALLY DISINTEGRATING ORAL 3 TIMES DAILY PRN
Qty: 21 TABLET | Refills: 0 | Status: SHIPPED | OUTPATIENT
Start: 2024-07-20

## 2024-07-20 RX ORDER — HYDROCODONE BITARTRATE AND ACETAMINOPHEN 5; 325 MG/1; MG/1
1 TABLET ORAL EVERY 6 HOURS PRN
Qty: 12 TABLET | Refills: 0 | Status: SHIPPED | OUTPATIENT
Start: 2024-07-20 | End: 2024-07-23

## 2024-07-20 RX ADMIN — MORPHINE SULFATE 4 MG: 4 INJECTION, SOLUTION INTRAMUSCULAR; INTRAVENOUS at 09:27

## 2024-07-20 RX ADMIN — ONDANSETRON 4 MG: 2 INJECTION INTRAMUSCULAR; INTRAVENOUS at 09:26

## 2024-07-20 RX ADMIN — SODIUM CHLORIDE 1000 ML: 9 INJECTION, SOLUTION INTRAVENOUS at 09:29

## 2024-07-20 ASSESSMENT — ENCOUNTER SYMPTOMS
ABDOMINAL PAIN: 1
SHORTNESS OF BREATH: 0
BLOOD IN STOOL: 0
NAUSEA: 1
BACK PAIN: 1
CONSTIPATION: 1
DIARRHEA: 0
VOMITING: 1

## 2024-07-20 ASSESSMENT — PAIN DESCRIPTION - ORIENTATION: ORIENTATION: LEFT

## 2024-07-20 ASSESSMENT — PAIN DESCRIPTION - LOCATION: LOCATION: BACK;FLANK

## 2024-07-20 ASSESSMENT — PAIN SCALES - GENERAL: PAINLEVEL_OUTOF10: 7

## 2024-07-20 NOTE — ED PROVIDER NOTES
[facility] EMERGENCY DEPT  eMERGENCY dEPARTMENT eNCOUnter      Pt Name: Stella Plata  MRN: [id number]  Birthdate 2000  Date of evaluation: 2024  Provider: JULIETTE Chavez    CHIEF COMPLAINT       Chief Complaint   Patient presents with    Back Pain    Flank Pain         HISTORY OF PRESENT ILLNESS   (Location/Symptom, Timing/Onset,Context/Setting, Quality, Duration, Modifying Factors, Severity)  Note limiting factors.   Stella Plata is a 23 y.o. female who presents to the emergency department with complaint of left-sided flank pain and back pain.  She states it started this morning.  She does have associated nausea and vomiting.  She notes some mild constipation but denies any diarrhea.  She denies fever or chills.  She does note the sensation of not feeling like she is done urinating and feel that she is having to strain with urination.  She denies any significant dysuria or hematuria.  She does note some mild vaginal bleeding but denies any known chance of pregnancy.  She denies vaginal discharge.  Abdominal surgical history includes , ovary removal, antibiotic.  HPI obtained with help of  as patient is [place]-speaking.    NursingNotes were reviewed.    REVIEW OF SYSTEMS    (2-9 systems for level 4, 10 or more for level 5)     Review of Systems   Constitutional:  Negative for chills and fever.   Respiratory:  Negative for shortness of breath.    Cardiovascular:  Negative for chest pain.   Gastrointestinal:  Positive for abdominal pain, constipation, nausea and vomiting. Negative for blood in stool and diarrhea.   Genitourinary:  Positive for difficulty urinating and flank pain. Negative for dysuria, frequency, hematuria, vaginal bleeding (Expected with menstrual cycle) and vaginal discharge.   Musculoskeletal:  Positive for back pain.   Neurological:  Negative for dizziness and headaches.   All other systems reviewed and are negative.           PAST MEDICALHISTORY     Past Medical  did show hematuria without infection.  CBC did not show leukocytosis or anemia.  CMP was negative for electrolyte disturbance, HOSSEIN, or LFT elevation.  CT was obtained and does confirm a 0.6 cm stone at the left UVJ.  There is mild hydroureteronephrosis associated.  Pain is controlled in the ED.  Plan for discharge and follow-up outpatient with urologist.  She was given medication to help with symptom control outpatient.  No warrant for antibiotics that she has no secondary signs of infection or signs of a septic stone.  Her hCG is negative.  She is now safe for discharge.  Return precautions were given to her prior to discharge and she verbalized understanding.  All questions were answered.   was used to go over results and plan to ensure comprehension as patient is [place]ron[place]-speaking.    CONSULTS:  None    PROCEDURES:  Unless otherwise noted below, none     Procedures    FINAL IMPRESSION      1. Kidney stone          DISPOSITION/PLAN   DISPOSITION Decision To Discharge 07/20/2024 11:02:20 AM      PATIENT REFERRED TO:  Elier Gilmore MD  [facility] [address] [address]  [phone]            DISCHARGE MEDICATIONS:  New Prescriptions    HYDROCODONE-ACETAMINOPHEN (LORCET) 5-325 MG PER TABLET    Take 1 tablet by mouth every 6 hours as needed for Pain for up to 3 days. Intended supply: 3 days. Take lowest dose possible to manage pain Max Daily Amount: 4 tablets    KETOROLAC (TORADOL) 10 MG TABLET    Take 1 tablet by mouth every 6 hours as needed for Pain    ONDANSETRON (ZOFRAN-ODT) 4 MG DISINTEGRATING TABLET    Take 1 tablet by mouth 3 times daily as needed for Nausea or Vomiting          (Please note that portions of this note were completed with a voice recognition program.  Efforts were made to edit thedictations but occasionally words are mis-transcribed.)    JULIETTE Chavez (electronically signed)       Keshawn Cervantes PA  07/20/24 1246

## (undated) DEVICE — CANSTR SXN UTER NO FLTR SURG

## (undated) DEVICE — PAD C-SECTION: Brand: MEDLINE INDUSTRIES, INC.

## (undated) DEVICE — 3M™ MEDIPORE™ H SOFT CLOTH SURGICAL TAPE 2868, 8 INCH X 10 YARD (20,3CM X 9,1M), 6 ROLLS/CASE: Brand: 3M™ MEDIPORE™

## (undated) DEVICE — SPONGE LAP W18XL18IN WHT COT 4 PLY FLD STRUNG RADPQ DISP ST 2 PER PACK

## (undated) DEVICE — MASK ROUND 60MM FPH (10) S/USE: Brand: FISHER & PAYKEL HEALTHCARE

## (undated) DEVICE — STERILE POLYISOPRENE POWDER-FREE SURGICAL GLOVES: Brand: PROTEXIS

## (undated) DEVICE — DRESSING NEG PRSS 13CM PREVENA

## (undated) DEVICE — SPNG GZ WOVN 4X4IN 12PLY 10/BX STRL

## (undated) DEVICE — TBG W FLTR FOR BERKELEY SYSTEM

## (undated) DEVICE — BARRIER ADH W3XL4IN UTER PELV ABSRB GYNECARE INTCEED

## (undated) DEVICE — SUT MNCRYL 0/0 CTX 36IN Y398H

## (undated) DEVICE — KENDALL SCD EXPRESS SLEEVES, KNEE LENGTH, LARGE: Brand: KENDALL SCD

## (undated) DEVICE — NON-ADHERENT PADS PREPACK: Brand: TELFA

## (undated) DEVICE — SUTURE MONOCRYL SZ 3-0 L27IN ABSRB UD L60MM KS STR REV CUT Y523H

## (undated) DEVICE — APPL CHLORAPREP HI/LITE 26ML ORNG

## (undated) DEVICE — GOWN,PREVENTION PLUS,XLARGE,STERILE: Brand: MEDLINE

## (undated) DEVICE — SUTURE STRATAFIX SYMMETRIC PDS + SZ 1 L18IN ABSRB VLT OS-6 SXPP1A201

## (undated) DEVICE — SUTURE VICRYL + SZ 2-0 L36IN ABSRB UD L36MM CT-1 1/2 CIR VCP945H

## (undated) DEVICE — SUT VIC RAPD SZ 3/0 27IN PS1 VR935

## (undated) DEVICE — TRAP TISS

## (undated) DEVICE — BINDER ABD H12IN COT FOR 45-62IN WAIST UNIV PREM 4 PNL DSGN

## (undated) DEVICE — TRAY EPI 25GA 3.5IN 0.75% BIPIVCAIN 8.25% D CONTAIN BPA

## (undated) DEVICE — LARGE, DISPOSABLE C-SECTION RETRACTOR: Brand: ALEXIS ® O C-SECTION PROTECTOR/RETRACTOR

## (undated) DEVICE — SUT VIC 0 CTX 36IN J978H

## (undated) DEVICE — GARMENT COMPR STD FOR 17IN CALF UNIF THER FLOTRN

## (undated) DEVICE — 3M™ STERI-STRIP™ BLEND TONE SKIN CLOSURES, B1557, TAN, 1/2 IN X 4 IN (12MM X 100MM), 6 STRIPS/ENVELOPE: Brand: 3M™ STERI-STRIP™

## (undated) DEVICE — GLOVE ORANGE PI 7 1/2   MSG9075

## (undated) DEVICE — SUTURE VICRYL + SZ 0 L36IN ABSRB UD L48MM CTX 1/2 CIR VCP978H

## (undated) DEVICE — CURETTE CANN RIGID CRV SHT 8MM STRL

## (undated) DEVICE — ADHS LIQ MASTISOL 2/3ML

## (undated) DEVICE — SUT VIC RAPD SZ 2/0 36IN CT1 VR945 BX/12

## (undated) DEVICE — PK TURNOVER RM ADV

## (undated) DEVICE — PAD D&C: Brand: MEDLINE INDUSTRIES, INC.

## (undated) DEVICE — U.V.A.C. SWIVEL HANDLE W/TUBING, 6': Brand: CONVERTORS

## (undated) DEVICE — 1LYRTR 16FR10ML 100%SILI SNAP: Brand: MEDLINE INDUSTRIES, INC.

## (undated) DEVICE — Device

## (undated) DEVICE — SUT GUT PLAIN TPR PT 2/0 27IN 53T

## (undated) DEVICE — 3M™ STERI-STRIP™ REINFORCED ADHESIVE SKIN CLOSURES, R1547, 1/2 IN X 4 IN (12 MM X 100 MM), 6 STRIPS/ENVELOPE: Brand: 3M™ STERI-STRIP™

## (undated) DEVICE — GLV SURG BIOGEL M LTX PF 8

## (undated) DEVICE — AIR SHEET,LAT,COMFORT GLIDE, BLEND 40X80: Brand: MEDLINE

## (undated) DEVICE — SUTURE VICRYL SZ 0 L36IN ABSRB VLT L36MM CT-1 1/2 CIR J346H